# Patient Record
Sex: FEMALE | Race: WHITE | Employment: OTHER | URBAN - METROPOLITAN AREA
[De-identification: names, ages, dates, MRNs, and addresses within clinical notes are randomized per-mention and may not be internally consistent; named-entity substitution may affect disease eponyms.]

---

## 2020-02-07 ENCOUNTER — HOSPITAL ENCOUNTER (EMERGENCY)
Age: 80
Discharge: HOME | End: 2020-02-07
Attending: EMERGENCY MEDICINE
Payer: MEDICARE

## 2020-02-07 ENCOUNTER — APPOINTMENT (OUTPATIENT)
Dept: GENERAL RADIOLOGY | Age: 80
End: 2020-02-07
Payer: MEDICARE

## 2020-02-07 VITALS
SYSTOLIC BLOOD PRESSURE: 138 MMHG | OXYGEN SATURATION: 94 % | HEIGHT: 67 IN | HEART RATE: 64 BPM | RESPIRATION RATE: 23 BRPM | TEMPERATURE: 97.2 F | DIASTOLIC BLOOD PRESSURE: 68 MMHG | BODY MASS INDEX: 33.27 KG/M2 | WEIGHT: 212 LBS

## 2020-02-07 LAB
ALBUMIN SERPL-MCNC: 3.4 G/DL (ref 3.4–5)
ALP BLD-CCNC: 126 U/L (ref 40–129)
ALT SERPL-CCNC: 43 U/L (ref 10–40)
ANION GAP SERPL CALCULATED.3IONS-SCNC: 16 MMOL/L (ref 3–16)
AST SERPL-CCNC: 43 U/L (ref 15–37)
BASOPHILS ABSOLUTE: 0 K/UL (ref 0–0.2)
BASOPHILS RELATIVE PERCENT: 0.4 %
BILIRUB SERPL-MCNC: 0.3 MG/DL (ref 0–1)
BILIRUBIN DIRECT: <0.2 MG/DL (ref 0–0.3)
BILIRUBIN URINE: NEGATIVE
BILIRUBIN, INDIRECT: ABNORMAL MG/DL (ref 0–1)
BLOOD, URINE: ABNORMAL
BUN BLDV-MCNC: 23 MG/DL (ref 7–20)
CALCIUM SERPL-MCNC: 9.2 MG/DL (ref 8.3–10.6)
CHLORIDE BLD-SCNC: 104 MMOL/L (ref 99–110)
CLARITY: CLEAR
CO2: 21 MMOL/L (ref 21–32)
COLOR: YELLOW
CREAT SERPL-MCNC: 0.8 MG/DL (ref 0.6–1.2)
EKG ATRIAL RATE: 66 BPM
EKG ATRIAL RATE: 81 BPM
EKG DIAGNOSIS: NORMAL
EKG DIAGNOSIS: NORMAL
EKG P AXIS: 69 DEGREES
EKG P AXIS: 74 DEGREES
EKG P-R INTERVAL: 266 MS
EKG P-R INTERVAL: 270 MS
EKG Q-T INTERVAL: 408 MS
EKG Q-T INTERVAL: 426 MS
EKG QRS DURATION: 80 MS
EKG QRS DURATION: 92 MS
EKG QTC CALCULATION (BAZETT): 446 MS
EKG QTC CALCULATION (BAZETT): 473 MS
EKG R AXIS: 44 DEGREES
EKG R AXIS: 68 DEGREES
EKG T AXIS: 43 DEGREES
EKG T AXIS: 66 DEGREES
EKG VENTRICULAR RATE: 66 BPM
EKG VENTRICULAR RATE: 81 BPM
EOSINOPHILS ABSOLUTE: 0 K/UL (ref 0–0.6)
EOSINOPHILS RELATIVE PERCENT: 0 %
EPITHELIAL CELLS, UA: 3 /HPF (ref 0–5)
GFR AFRICAN AMERICAN: >60
GFR NON-AFRICAN AMERICAN: >60
GLUCOSE BLD-MCNC: 197 MG/DL (ref 70–99)
GLUCOSE URINE: NEGATIVE MG/DL
HCT VFR BLD CALC: 45.4 % (ref 36–48)
HEMOGLOBIN: 14.9 G/DL (ref 12–16)
HYALINE CASTS: 2 /LPF (ref 0–8)
INR BLD: 1 (ref 0.86–1.14)
KETONES, URINE: NEGATIVE MG/DL
LEUKOCYTE ESTERASE, URINE: NEGATIVE
LYMPHOCYTES ABSOLUTE: 0.5 K/UL (ref 1–5.1)
LYMPHOCYTES RELATIVE PERCENT: 7.9 %
MAGNESIUM: 1.8 MG/DL (ref 1.8–2.4)
MCH RBC QN AUTO: 31.4 PG (ref 26–34)
MCHC RBC AUTO-ENTMCNC: 32.8 G/DL (ref 31–36)
MCV RBC AUTO: 95.7 FL (ref 80–100)
MICROSCOPIC EXAMINATION: YES
MONOCYTES ABSOLUTE: 0.2 K/UL (ref 0–1.3)
MONOCYTES RELATIVE PERCENT: 2.4 %
NEUTROPHILS ABSOLUTE: 5.8 K/UL (ref 1.7–7.7)
NEUTROPHILS RELATIVE PERCENT: 89.3 %
NITRITE, URINE: NEGATIVE
PDW BLD-RTO: 16 % (ref 12.4–15.4)
PH UA: 5.5 (ref 5–8)
PLATELET # BLD: 280 K/UL (ref 135–450)
PMV BLD AUTO: 8.4 FL (ref 5–10.5)
POTASSIUM SERPL-SCNC: 5 MMOL/L (ref 3.5–5.1)
PRO-BNP: 649 PG/ML (ref 0–449)
PROTEIN UA: 30 MG/DL
PROTHROMBIN TIME: 11.6 SEC (ref 10–13.2)
RBC # BLD: 4.75 M/UL (ref 4–5.2)
RBC UA: 3 /HPF (ref 0–4)
SODIUM BLD-SCNC: 141 MMOL/L (ref 136–145)
SPECIFIC GRAVITY UA: 1.02 (ref 1–1.03)
TOTAL PROTEIN: 6.6 G/DL (ref 6.4–8.2)
TROPONIN: <0.01 NG/ML
TROPONIN: <0.01 NG/ML
URINE REFLEX TO CULTURE: YES
URINE TYPE: ABNORMAL
UROBILINOGEN, URINE: 0.2 E.U./DL
WBC # BLD: 6.5 K/UL (ref 4–11)
WBC UA: 23 /HPF (ref 0–5)

## 2020-02-07 PROCEDURE — 99285 EMERGENCY DEPT VISIT HI MDM: CPT

## 2020-02-07 PROCEDURE — 93005 ELECTROCARDIOGRAM TRACING: CPT | Performed by: EMERGENCY MEDICINE

## 2020-02-07 PROCEDURE — 93010 ELECTROCARDIOGRAM REPORT: CPT | Performed by: INTERNAL MEDICINE

## 2020-02-07 PROCEDURE — 84484 ASSAY OF TROPONIN QUANT: CPT

## 2020-02-07 PROCEDURE — 83880 ASSAY OF NATRIURETIC PEPTIDE: CPT

## 2020-02-07 PROCEDURE — 81001 URINALYSIS AUTO W/SCOPE: CPT

## 2020-02-07 PROCEDURE — 83735 ASSAY OF MAGNESIUM: CPT

## 2020-02-07 PROCEDURE — 96372 THER/PROPH/DIAG INJ SC/IM: CPT

## 2020-02-07 PROCEDURE — 93005 ELECTROCARDIOGRAM TRACING: CPT | Performed by: NURSE PRACTITIONER

## 2020-02-07 PROCEDURE — 87086 URINE CULTURE/COLONY COUNT: CPT

## 2020-02-07 PROCEDURE — 85025 COMPLETE CBC W/AUTO DIFF WBC: CPT

## 2020-02-07 PROCEDURE — 36415 COLL VENOUS BLD VENIPUNCTURE: CPT

## 2020-02-07 PROCEDURE — 6360000002 HC RX W HCPCS: Performed by: NURSE PRACTITIONER

## 2020-02-07 PROCEDURE — 80048 BASIC METABOLIC PNL TOTAL CA: CPT

## 2020-02-07 PROCEDURE — 80076 HEPATIC FUNCTION PANEL: CPT

## 2020-02-07 PROCEDURE — 71046 X-RAY EXAM CHEST 2 VIEWS: CPT

## 2020-02-07 PROCEDURE — 85610 PROTHROMBIN TIME: CPT

## 2020-02-07 RX ORDER — CEPHALEXIN 500 MG/1
500 CAPSULE ORAL 2 TIMES DAILY
Qty: 14 CAPSULE | Refills: 0 | Status: SHIPPED | OUTPATIENT
Start: 2020-02-07 | End: 2020-02-14

## 2020-02-07 RX ADMIN — ENOXAPARIN SODIUM 40 MG: 40 INJECTION SUBCUTANEOUS at 10:25

## 2020-02-07 NOTE — ED PROVIDER NOTES
chloride (KLOR-CON) 20 MEQ packet Take 20 mEq by mouth daily. methotrexate (RHEUMATREX) 2.5 MG chemo tablet Take 2.5 mg by mouth See Admin Instructions. 4 a week      hyoscyamine (ANASPAZ;LEVSIN) 0.125 MG tablet Take 0.125 mg by mouth every 6 hours as needed. PARoxetine (PAXIL) 40 MG tablet Take 40 mg by mouth every morning. atenolol (TENORMIN) 50 MG tablet Take 50 mg by mouth daily. valsartan (DIOVAN) 320 MG tablet Take 320 mg by mouth daily. Cholecalciferol (VITAMIN D3) 5000 UNITS TABS Take 10,000 Units by mouth every 7 days. therapeutic multivitamin-minerals (THERAGRAN-M) tablet Take 1 tablet by mouth daily. calcium carbonate (OSCAL) 500 MG TABS tablet Take 500 mg by mouth 2 times daily. ALLERGIES     Imitrex [sumatriptan] and Statins    FAMILYHISTORY     History reviewed. No pertinent family history. SOCIAL HISTORY       Social History     Tobacco Use    Smoking status: Never Smoker   Substance Use Topics    Alcohol use: No    Drug use: No       SCREENINGS             PHYSICAL EXAM    (up to 7 for level 4, 8 or more for level 5)     ED Triage Vitals [02/07/20 0432]   BP Temp Temp Source Pulse Resp SpO2 Height Weight   (!) 174/87 97.2 °F (36.2 °C) Infrared 82 18 98 % 5' 7\" (1.702 m) 212 lb (96.2 kg)       Physical Exam  Vitals signs and nursing note reviewed. Constitutional:       General: She is awake. Appearance: Normal appearance. She is well-developed and overweight. HENT:      Head: Normocephalic and atraumatic. Nose: Nose normal.   Eyes:      General:         Right eye: No discharge. Left eye: No discharge. Neck:      Musculoskeletal: Normal range of motion. Cardiovascular:      Rate and Rhythm: Normal rate and regular rhythm. Heart sounds: Normal heart sounds. Pulmonary:      Effort: Pulmonary effort is normal. No respiratory distress. Breath sounds: Normal breath sounds.    Abdominal: General: Bowel sounds are normal.      Palpations: Abdomen is soft. Tenderness: There is no abdominal tenderness. Musculoskeletal: Normal range of motion. Right lower le+ Edema present. Left lower le+ Edema present. Skin:     General: Skin is warm and dry. Coloration: Skin is not pale. Neurological:      Mental Status: She is alert and oriented to person, place, and time. Psychiatric:         Behavior: Behavior normal. Behavior is cooperative.          DIAGNOSTIC RESULTS   LABS:    Labs Reviewed   BASIC METABOLIC PANEL - Abnormal; Notable for the following components:       Result Value    Glucose 197 (*)     BUN 23 (*)     All other components within normal limits    Narrative:     Performed at:  [facility] [address]   Phone [phone]   CBC WITH AUTO DIFFERENTIAL - Abnormal; Notable for the following components:    RDW 16.0 (*)     Lymphocytes Absolute 0.5 (*)     All other components within normal limits    Narrative:     Performed at:  [facility] [address]   Phone [phone]   HEPATIC FUNCTION PANEL - Abnormal; Notable for the following components:    ALT 43 (*)     AST 43 (*)     All other components within normal limits    Narrative:     Performed at:  [facility] [address], [place]   Phone [phone]  - Abnormal; Notable for the following components:    Pro- (*)     All other components within normal limits    Narrative:     Performed at:  [facility] [address], [place]   Phone [phone]   TROPONIN    Narrative:     Performed at:  [facility] [address], [place]   Phone [phone]   TROPONIN    Narrative:     Performed at:  [facility]) - injection 40 mg (40 mg Subcutaneous Given 2/7/20 1025)         Pertinent Labs & Imaging studies reviewed. (See chart for details)   -  Patient seen and evaluated in the emergency department. -  Triage and nursing notes reviewed and incorporated. -  Old chart records reviewed and incorporated. -  Patient case discussed with attending physician, Dr. Minnie Molina. They saw and examined patient. -  Differential diagnosis includes: UTI, a-fib, dehydration, MI, ACS, vs  pneumonia,   -  Work-up included:  See above CBC, BMP, hepatic function, troponin x2, BNP UA, EKG, CXR  -  ED treatment included: Lovenox  - Consults: none   -  Results discussed with patient. Labs show  glucose 197, negative troponin, BUN 23. CBC is unremarkable. Pt was given strict return precautions. The patient is agreeable with plan of care and disposition.  -  Disposition: home      FINAL IMPRESSION      1. Atrial fibrillation, unspecified type ([facility] [address]    2.  Acute UTI          DISPOSITION/PLAN   DISPOSITION        PATIENT REFERREDTO:  Samson Yadav MD    Call in 2 days  As needed, If symptoms worsen    [facility] Emergency Department  [address]  Go to   As needed    Daryn Cabrera MD  [address] [place] [address] [facility]  [phone]    Call in 2 days      Ambrose Armstrong MD  [address] [place] [address] [address] [facility]  [phone]    Call in 2 days  As needed, If symptoms worsen      DISCHARGE MEDICATIONS:  Discharge Medication List as of 2/7/2020 10:34 AM      START taking these medications    Details   cephALEXin (KEFLEX) 500 MG capsule Take 1 capsule by mouth 2 times daily for 7 days, Disp-14 capsule, R-0Print             DISCONTINUED MEDICATIONS:  Discharge Medication List as of 2/7/2020 10:34 AM                 (Please note that portions of this note were completed with a voice recognition program.  Efforts were made to edit the dictations but occasionally words are mis-transcribed.)    Barnett Angelucci, APRN - CNP (electronically signed)            Barnett Angelucci, APRN - CNP  02/11/20 1940
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
## 2020-02-08 LAB — URINE CULTURE, ROUTINE: NORMAL

## 2020-11-06 ENCOUNTER — HOSPITAL ENCOUNTER (EMERGENCY)
Age: 80
Discharge: HOME | End: 2020-11-07
Attending: EMERGENCY MEDICINE
Payer: MEDICARE

## 2020-11-06 ENCOUNTER — APPOINTMENT (OUTPATIENT)
Dept: GENERAL RADIOLOGY | Age: 80
End: 2020-11-06
Payer: MEDICARE

## 2020-11-06 PROCEDURE — 99284 EMERGENCY DEPT VISIT MOD MDM: CPT

## 2020-11-06 PROCEDURE — 93005 ELECTROCARDIOGRAM TRACING: CPT | Performed by: EMERGENCY MEDICINE

## 2020-11-06 PROCEDURE — 71046 X-RAY EXAM CHEST 2 VIEWS: CPT

## 2020-11-07 VITALS
HEIGHT: 67 IN | RESPIRATION RATE: 20 BRPM | TEMPERATURE: 98.2 F | OXYGEN SATURATION: 98 % | SYSTOLIC BLOOD PRESSURE: 140 MMHG | WEIGHT: 210 LBS | HEART RATE: 79 BPM | DIASTOLIC BLOOD PRESSURE: 79 MMHG | BODY MASS INDEX: 32.96 KG/M2

## 2020-11-07 LAB
A/G RATIO: 1.3 (ref 1.1–2.2)
ALBUMIN SERPL-MCNC: 3.8 G/DL (ref 3.4–5)
ALP BLD-CCNC: 114 U/L (ref 40–129)
ALT SERPL-CCNC: 27 U/L (ref 10–40)
ANION GAP SERPL CALCULATED.3IONS-SCNC: 9 MMOL/L (ref 3–16)
AST SERPL-CCNC: 22 U/L (ref 15–37)
BASOPHILS ABSOLUTE: 0.1 K/UL (ref 0–0.2)
BASOPHILS RELATIVE PERCENT: 0.6 %
BILIRUB SERPL-MCNC: 0.5 MG/DL (ref 0–1)
BUN BLDV-MCNC: 28 MG/DL (ref 7–20)
CALCIUM SERPL-MCNC: 9.5 MG/DL (ref 8.3–10.6)
CHLORIDE BLD-SCNC: 103 MMOL/L (ref 99–110)
CO2: 27 MMOL/L (ref 21–32)
CREAT SERPL-MCNC: 0.8 MG/DL (ref 0.6–1.2)
EKG ATRIAL RATE: 68 BPM
EKG DIAGNOSIS: NORMAL
EKG P AXIS: 93 DEGREES
EKG P-R INTERVAL: 236 MS
EKG Q-T INTERVAL: 436 MS
EKG QRS DURATION: 98 MS
EKG QTC CALCULATION (BAZETT): 463 MS
EKG R AXIS: 34 DEGREES
EKG T AXIS: 36 DEGREES
EKG VENTRICULAR RATE: 68 BPM
EOSINOPHILS ABSOLUTE: 0.2 K/UL (ref 0–0.6)
EOSINOPHILS RELATIVE PERCENT: 2.3 %
GFR AFRICAN AMERICAN: >60
GFR NON-AFRICAN AMERICAN: >60
GLOBULIN: 3 G/DL
GLUCOSE BLD-MCNC: 114 MG/DL (ref 70–99)
HCT VFR BLD CALC: 42.5 % (ref 36–48)
HEMOGLOBIN: 14.2 G/DL (ref 12–16)
INR BLD: 1.28 (ref 0.86–1.14)
LYMPHOCYTES ABSOLUTE: 1.3 K/UL (ref 1–5.1)
LYMPHOCYTES RELATIVE PERCENT: 12.1 %
MCH RBC QN AUTO: 33.4 PG (ref 26–34)
MCHC RBC AUTO-ENTMCNC: 33.5 G/DL (ref 31–36)
MCV RBC AUTO: 99.6 FL (ref 80–100)
MONOCYTES ABSOLUTE: 0.9 K/UL (ref 0–1.3)
MONOCYTES RELATIVE PERCENT: 8.3 %
NEUTROPHILS ABSOLUTE: 8 K/UL (ref 1.7–7.7)
NEUTROPHILS RELATIVE PERCENT: 76.7 %
PDW BLD-RTO: 16.5 % (ref 12.4–15.4)
PLATELET # BLD: 237 K/UL (ref 135–450)
PMV BLD AUTO: 9.4 FL (ref 5–10.5)
POTASSIUM SERPL-SCNC: 3.9 MMOL/L (ref 3.5–5.1)
PRO-BNP: 59 PG/ML (ref 0–449)
PROTHROMBIN TIME: 14.9 SEC (ref 10–13.2)
RBC # BLD: 4.26 M/UL (ref 4–5.2)
REASON FOR REJECTION: NORMAL
REJECTED TEST: NORMAL
SODIUM BLD-SCNC: 139 MMOL/L (ref 136–145)
TOTAL PROTEIN: 6.8 G/DL (ref 6.4–8.2)
TROPONIN: <0.01 NG/ML
WBC # BLD: 10.4 K/UL (ref 4–11)

## 2020-11-07 PROCEDURE — 85025 COMPLETE CBC W/AUTO DIFF WBC: CPT

## 2020-11-07 PROCEDURE — 6370000000 HC RX 637 (ALT 250 FOR IP): Performed by: EMERGENCY MEDICINE

## 2020-11-07 PROCEDURE — 6370000000 HC RX 637 (ALT 250 FOR IP): Performed by: NURSE PRACTITIONER

## 2020-11-07 PROCEDURE — 83880 ASSAY OF NATRIURETIC PEPTIDE: CPT

## 2020-11-07 PROCEDURE — 85610 PROTHROMBIN TIME: CPT

## 2020-11-07 PROCEDURE — 84484 ASSAY OF TROPONIN QUANT: CPT

## 2020-11-07 PROCEDURE — 80053 COMPREHEN METABOLIC PANEL: CPT

## 2020-11-07 PROCEDURE — 93010 ELECTROCARDIOGRAM REPORT: CPT | Performed by: INTERNAL MEDICINE

## 2020-11-07 RX ORDER — WARFARIN SODIUM 5 MG/1
5 TABLET ORAL ONCE
Status: COMPLETED | OUTPATIENT
Start: 2020-11-07 | End: 2020-11-07

## 2020-11-07 RX ORDER — HYDROCODONE BITARTRATE AND ACETAMINOPHEN 5; 325 MG/1; MG/1
1 TABLET ORAL ONCE
Status: COMPLETED | OUTPATIENT
Start: 2020-11-07 | End: 2020-11-07

## 2020-11-07 RX ADMIN — WARFARIN SODIUM 5 MG: 5 TABLET ORAL at 04:31

## 2020-11-07 RX ADMIN — HYDROCODONE BITARTRATE AND ACETAMINOPHEN 1 TABLET: 5; 325 TABLET ORAL at 04:31

## 2020-11-07 ASSESSMENT — PAIN SCALES - GENERAL: PAINLEVEL_OUTOF10: 9

## 2020-11-07 ASSESSMENT — ENCOUNTER SYMPTOMS
SHORTNESS OF BREATH: 1
NAUSEA: 0
ABDOMINAL PAIN: 0
VOMITING: 0
DIARRHEA: 0
CHEST TIGHTNESS: 0

## 2020-11-07 NOTE — ED NOTES
Pt assisted up to [facility] x 2 throughout ED stay. Pt states she feels her heart \"beating really fast\" and asking if she's in afib after getting up. HR = 110 and regular. Denies chest pain or dizziness/lightheadedness. To be discharged home.      Alexandra Mcleod RN  11/07/20 0851

## 2020-11-07 NOTE — ED NOTES
Bed: 01  Expected date:   Expected time:   Means of arrival:   Comments:  critical     Kelton Shah RN  11/07/20 0041

## 2020-11-07 NOTE — ED PROVIDER NOTES
I independently performed a history and physical on Eduardo Robertson. All diagnostic, treatment, and disposition decisions were made by myself in conjunction with the advanced practice provider. Briefly, this is a 78 y.o. female here for A. fib with RVR. Started about 2:30 PM.  States that it lasted for roughly an hour. Counted her heart rate in 120s. Felt short of breath during this time but it seemed to persist after her heart rate improved. She did take an extra flecainide as instructed by her cardiologist for when this happens. Feels anxious. During my conversation with the patient, she states that her shortness of breath has significantly improved and she now wishes to go home. States that her Coumadin is likely low because she did not take her last dose    On exam,   General: Patient is in no acute distress  Skin: No cyanosis  HEENT: Moist mucous membranes  Heart: Regular rate, regular rhythm  Lung: No respiratory distress  Abdomen: Soft, nontender  Neuro: Moving all extremities, no facial droop, no slurred speech, answers questions appropriately        EKG  The Ekg interpreted by me in the absence of a cardiologist shows. Normal sinus rhythm  No acute ST changes. Nonspecific t wave abnormality    Screenings            MDM  Patient is an 49-year-old woman who presents with chief complaint of shortness of breath and A. fib RVR that self resolved after administration of flecainide at home. During my examination, shortness of breath has significantly improved. Never had any chest pain. No clear inciting factor for her A. fib. States that it does this once in a while. Currently in sinus rhythm. Blood work unremarkable at this time, with the exception of subtherapeutic INR. Discussed with pharmacy who recommended administration of Coumadin at this time and for patient to stay on 4 mg until her Coumadin clinic opens on Monday and she can call them.   They did not feel that she needed to be bridged at this time. Offered patient admission for observation and she did not wish to have this. Given timing of shortness of breath coinciding with onset of A. fib RVR and this being a chronic issue, I do not believe that this is due to ACS. Troponin unremarkable. Do not believe that this is due to pulmonary embolism. No current tachycardia, tachypnea, hypoxemia, unilateral DVT symptoms. All questions answered and return precautions given    Patient Referrals:  Marilia Obrien., MD    Schedule an appointment as soon as possible for a visit         Discharge Medications:  New Prescriptions    No medications on file       FINAL IMPRESSION  1. Subtherapeutic international normalized ratio (INR)    2. Paroxysmal atrial fibrillation (HCC)        Blood pressure (!) 161/74, pulse 74, temperature 98.2 °F (36.8 °C), temperature source Oral, resp. rate 19, height 5' 7\" (1.702 m), weight 210 lb (95.3 kg), SpO2 97 %. For further details of [facility] emergency department encounter, please see documentation by advanced practice provider, Olen Sandhoff.         Preeti Jones MD  11/07/20 4724

## 2020-11-07 NOTE — ED PROVIDER NOTES
tablet by mouth every 6 hours as needed. HYOSCYAMINE (ANASPAZ;LEVSIN) 0.125 MG TABLET    Take 0.125 mg by mouth every 6 hours as needed. INFLIXIMAB (REMICADE IV)    Infuse  intravenously. METHIMAZOLE (TAPAZOLE) 5 MG TABLET    Take 5 mg by mouth 3 times daily. METHOTREXATE (RHEUMATREX) 2.5 MG CHEMO TABLET    Take 2.5 mg by mouth See Admin Instructions. 4 a week    PAROXETINE (PAXIL) 40 MG TABLET    Take 40 mg by mouth every morning. POTASSIUM CHLORIDE (KLOR-CON) 20 MEQ PACKET    Take 20 mEq by mouth daily. THERAPEUTIC MULTIVITAMIN-MINERALS (THERAGRAN-M) TABLET    Take 1 tablet by mouth daily. VALSARTAN (DIOVAN) 320 MG TABLET    Take 320 mg by mouth daily. WARFARIN (COUMADIN) 3 MG TABLET    Take 1 tablet by mouth daily. ALLERGIES     Imitrex [sumatriptan] and Statins    FAMILYHISTORY     History reviewed. No pertinent family history. SOCIAL HISTORY       Social History     Tobacco Use    Smoking status: Never Smoker    Smokeless tobacco: Never Used   Substance Use Topics    Alcohol use: No    Drug use: No       SCREENINGS             PHYSICAL EXAM    (up to 7 for level 4, 8 or more for level 5)     ED Triage Vitals   BP Temp Temp Source Pulse Resp SpO2 Height Weight   11/06/20 1921 11/06/20 1921 11/06/20 1921 11/06/20 1921 11/06/20 1914 11/06/20 1921 11/06/20 1914 11/06/20 1914   122/80 98.2 °F (36.8 °C) Oral 71 16 95 % 5' 7\" (1.702 m) 210 lb (95.3 kg)       Physical Exam  Vitals signs and nursing note reviewed. Constitutional:       Appearance: She is well-developed. She is not diaphoretic. HENT:      Head: Normocephalic and atraumatic. Right Ear: External ear normal.      Left Ear: External ear normal.   Eyes:      General:         Right eye: No discharge. Left eye: No discharge. Neck:      Musculoskeletal: Normal range of motion and neck supple. Vascular: No JVD. Cardiovascular:      Rate and Rhythm: Normal rate and regular rhythm. Pulses: Normal pulses. Heart sounds: Normal heart sounds. Pulmonary:      Effort: Pulmonary effort is normal. No respiratory distress. Breath sounds: Normal breath sounds. Abdominal:      Palpations: Abdomen is soft. Musculoskeletal: Normal range of motion. Skin:     General: Skin is warm and dry. Coloration: Skin is not pale. Neurological:      Mental Status: She is alert and oriented to person, place, and time. Psychiatric:         Behavior: Behavior normal.         DIAGNOSTIC RESULTS   LABS:    Labs Reviewed   COMPREHENSIVE METABOLIC PANEL - Abnormal; Notable for the following components:       Result Value    Glucose 114 (*)     BUN 28 (*)     All other components within normal limits    Narrative:     Charly Dunne  [facility] tel. [phone],  Performed at:  [facility] [address], [place], [place]   Phone [phone]   PROTIME-INR - Abnormal; Notable for the following components:    Protime 14.9 (*)     INR 1.28 (*)     All other components within normal limits    Narrative:     Performed at:  [facility] [address], [place], [address] [employer]   Phone [phone]   CBC WITH AUTO DIFFERENTIAL - Abnormal; Notable for the following components:    RDW 16.5 (*)     Neutrophils Absolute 8.0 (*)     All other components within normal limits    Narrative:     Performed at:  [facility] [address], [place], [place]   Phone [phone]   TROPONIN    Narrative:     Charly Dunne  [facility] tel. [phone],  Performed at:  [facility] [address], [place]   Phone [phone]    Narrative:     Charly Dunne  [facility] tel. [phone],  Performed at:  [facility] [address],  [place]   Phone [phone]    Narrative:     DANISH Chambers  [facility] tel. [phone],  Performed at:  [facility]  [address],  [place], [address]   Phone [phone]       All other labs were within normal range or not returned as of this dictation. EKG: All EKG's are interpreted by the Emergency Department Physician in the absence of a cardiologist.  Please see their note for interpretation of EKG. RADIOLOGY:   Non-plain film images such as CT, Ultrasound and MRI are read by the radiologist. Plain radiographic images are visualized and preliminarily interpreted by the ED Provider with the below findings:        Interpretation per the Radiologist below, if available at the time of this note:    XR CHEST (2 VW)   Final Result   No acute cardiopulmonary disease is appreciated. Xr Chest (2 Vw)    Result Date: 11/6/2020  EXAMINATION: TWO XRAY VIEWS OF THE CHEST 11/6/2020 8:54 pm COMPARISON: 02/07/2020 HISTORY: ORDERING SYSTEM PROVIDED HISTORY: chest pain TECHNOLOGIST PROVIDED HISTORY: Reason for exam:->chest pain Reason for Exam: Shortness of Breath (pt in from home via [facility] ems reporting VSS, pt c/o SOB intermittently with hx of a-fib. pt c/o chills has had 3 negative COVID tests recently.) Acuity: Unknown Type of Exam: Unknown FINDINGS: The cardiac silhouette is normal.  Thoracic aorta is tortuous. There is scalloping of the right hemidiaphragm. No airspace consolidation is identified. No pneumothorax is seen. No acute osseous abnormality. No acute cardiopulmonary disease is appreciated. PROCEDURES   Unless otherwise noted below, none     Procedures    CRITICAL CARE TIME   The total critical care time spent while evaluating and treating this patient was at least 45 minutes. This excludes time spent doing separately billable procedures.   This includes time at the bedside, data interpretation, medication management, obtaining critical history from collateral sources if the patient is unable to provide it directly, and physician consultation. Specifics of interventions taken and potentially life-threatening diagnostic considerations are listed above in the medical decision making. CONSULTS:  None      EMERGENCY DEPARTMENT COURSE and DIFFERENTIAL DIAGNOSIS/MDM:   Vitals:    Vitals:    11/07/20 0400 11/07/20 0420 11/07/20 0440 11/07/20 0500   BP: (!) 156/66 138/84 (!) 162/76 (!) 161/74   Pulse: 72 75 77 74   Resp: 12 20 23 19   Temp:       TempSrc:       SpO2: 99% 96% 99% 97%   Weight:       Height:           Patient was given the following medications:  Medications   warfarin (COUMADIN) tablet 5 mg (5 mg Oral Given 11/7/20 0431)   HYDROcodone-acetaminophen (NORCO) 5-325 MG per tablet 1 tablet (1 tablet Oral Given 11/7/20 0431)           Briefly, this is a 78year old female that presents with paroxysmal atrial fibrillation, states that her pulse rate was very fast, stating more than 100. States her blood pressure was elevated and she was increasingly short of breath. States that her heart rhythm has flipped back to sinus and her extended wait in the emergency department waiting room this evening although she does remain slightly short of breath. XR CHEST (2 VW) (Final result)   Result time 11/06/20 21:18:02   Final result by Symone Lyle MD (11/06/20 21:18:02)                 Impression:     No acute cardiopulmonary disease is appreciated. CBC is unremarkable. CMP unremarkable. Troponin negative. INR subtherapeutic at 1.28. BNP 59. XR CHEST (2 VW) (Final result)   Result time 11/06/20 21:18:02   Final result by Symone Lyle MD (11/06/20 21:18:02)                 Impression:     No acute cardiopulmonary disease is appreciated. Did call pharmacy and an additional dose of warfarin was suggested for tonight, patient should continue her 4 mg nightly and have her INR rechecked Monday.     We did discuss admission versus discharge home, patient states that she is actually feeling much better and would like to be discharged. We did discuss close outpatient follow-up and strict return precautions. Patient is agreeable regarding plan of care. She is in sinus rhythm. Patient's oxygen saturations are good. I do not believe the patient's symptoms today are due to pulmonary embolism, pulmonary edema, pneumonia, pneumothorax, ACS, CHF, status asthmaticus, acute respiratory failure, profound anemia or metabolic abnormality, amongst other more emergent diagnostic considerations. Patient was advised to immediately return to emergency department if symptoms worsen. FINAL IMPRESSION      1.  Subtherapeutic international normalized ratio (INR)    2. Paroxysmal atrial fibrillation [facility])          DISPOSITION/PLAN   DISPOSITION Decision To Discharge 11/07/2020 05:08:00 AM      PATIENT REFERREDTO:  Swati Geller MD    Schedule an appointment as soon as possible for a visit         DISCHARGE MEDICATIONS:  New Prescriptions    No medications on file       DISCONTINUED MEDICATIONS:  Discontinued Medications    No medications on file              (Please note that portions of this note were completed with a voice recognition program.  Efforts were made to edit the dictations but occasionally words are mis-transcribed.)    LAUREN Pearson CNP (electronically signed)           LAUREN Pearson CNP  11/07/20 4274

## 2021-03-05 NOTE — PROGRESS NOTES
[facility]  [phone]        Pre-Op Phone Call:     Patient Name: Marshall Beverly     Telephone Information:   Mobile [phone]     Home phone:  [phone]    Surgery Time:   [phone] Time:  1010     Left extended Message: n/a     Message left with: n/a     Recent change in health status:  No     Advised of transportation/ policy:  Follow md instruction    NPO policy reviewed:  NA     Advised to take morning heart/blood pressure medications with sips of water morning of surgery? Yes     Instructed to bring eye drops, photo identification, and insurance card day of surgery? Yes     Advised to wear short sleeved button down shirt (no T-shirt underneath):  NA     Advised not to wear jewelry, hairpins, or pantyhose day of surgery? NA     Advised not to wear make-up and to wash face day of surgery?   NA    Remarks:        Electronically signed by:  Emmett Angel RN at 3/5/2021 12:01 PM

## 2021-03-08 ENCOUNTER — HOSPITAL ENCOUNTER (OUTPATIENT)
Dept: SURGERY | Age: 81
Discharge: HOME | End: 2021-03-08
Payer: MEDICARE

## 2021-03-08 VITALS — DIASTOLIC BLOOD PRESSURE: 77 MMHG | SYSTOLIC BLOOD PRESSURE: 135 MMHG

## 2021-03-08 PROCEDURE — 6370000000 HC RX 637 (ALT 250 FOR IP): Performed by: OPHTHALMOLOGY

## 2021-03-08 PROCEDURE — 66821 AFTER CATARACT LASER SURGERY: CPT

## 2021-03-08 RX ORDER — SODIUM CHLORIDE 0.9 % (FLUSH) 0.9 %
10 SYRINGE (ML) INJECTION EVERY 12 HOURS SCHEDULED
Status: DISCONTINUED | OUTPATIENT
Start: 2021-03-08 | End: 2021-03-09 | Stop reason: HOSPADM

## 2021-03-08 RX ORDER — PHENYLEPHRINE HCL 2.5 %
1 DROPS OPHTHALMIC (EYE) ONCE
Status: COMPLETED | OUTPATIENT
Start: 2021-03-08 | End: 2021-03-08

## 2021-03-08 RX ORDER — SODIUM CHLORIDE 0.9 % (FLUSH) 0.9 %
10 SYRINGE (ML) INJECTION PRN
Status: DISCONTINUED | OUTPATIENT
Start: 2021-03-08 | End: 2021-03-09 | Stop reason: HOSPADM

## 2021-03-08 RX ORDER — TROPICAMIDE 10 MG/ML
1 SOLUTION/ DROPS OPHTHALMIC ONCE
Status: COMPLETED | OUTPATIENT
Start: 2021-03-08 | End: 2021-03-08

## 2021-03-08 RX ADMIN — TROPICAMIDE 1 DROP: 10 SOLUTION/ DROPS OPHTHALMIC at 10:42

## 2021-03-08 RX ADMIN — PHENYLEPHRINE HYDROCHLORIDE 1 DROP: 25 SOLUTION/ DROPS OPHTHALMIC at 10:42

## 2021-03-08 ASSESSMENT — PAIN - FUNCTIONAL ASSESSMENT: PAIN_FUNCTIONAL_ASSESSMENT: 0-10

## 2021-03-08 NOTE — OP NOTE
[facility]  [address] [address] [address] [place] [address]  [phone]      3/8/2021    Patient name: Nick Chu  YOB: 1940  MRN: [id number]    Allergies: Allergies   Allergen Reactions    Imitrex [Sumatriptan] Other (See Comments)     A-fib    Statins Other (See Comments)     Leg cramps       Pre-operative diagnosis:  After cataract obscuring vision of  both eyes. Post-operative diagnosis:  Same    Procedure Performed:  YAG Capsulotomy of both eyes. Anesthesia:  None    Estimated Blood Loss: None    Specimens removed: None    Complications:  None    Description of Procedure: A Yag Capsulotomy was performed today on both the left and right eyes. Pt appears to be oriented to time, place, and person. Pre-op medications instilled in the left eye: Proparacaine 1 drop, Palmer-Synephrine 2.5% 1 drop topical and Mydriacyl 0.5% 1 drop topical. Patient is comfortable and will be released to self.      Electronically signed by: Roberto Dow MD,3/8/2021,11:49 AM

## 2021-03-09 ENCOUNTER — HOSPITAL ENCOUNTER (EMERGENCY)
Age: 81
Discharge: HOME | End: 2021-03-09
Attending: EMERGENCY MEDICINE
Payer: MEDICARE

## 2021-03-09 VITALS
DIASTOLIC BLOOD PRESSURE: 90 MMHG | RESPIRATION RATE: 20 BRPM | BODY MASS INDEX: 33.59 KG/M2 | TEMPERATURE: 97.6 F | SYSTOLIC BLOOD PRESSURE: 187 MMHG | OXYGEN SATURATION: 98 % | HEART RATE: 81 BPM | HEIGHT: 67 IN | WEIGHT: 214 LBS

## 2021-03-09 DIAGNOSIS — I10 HYPERTENSION, UNSPECIFIED TYPE: Primary | ICD-10-CM

## 2021-03-09 LAB
A/G RATIO: 1.2 (ref 1.1–2.2)
ALBUMIN SERPL-MCNC: 3.5 G/DL (ref 3.4–5)
ALP BLD-CCNC: 130 U/L (ref 40–129)
ALT SERPL-CCNC: 38 U/L (ref 10–40)
ANION GAP SERPL CALCULATED.3IONS-SCNC: 12 MMOL/L (ref 3–16)
APTT: 40.6 SEC (ref 24.2–36.2)
AST SERPL-CCNC: 36 U/L (ref 15–37)
BASOPHILS ABSOLUTE: 0 K/UL (ref 0–0.2)
BASOPHILS RELATIVE PERCENT: 0.2 %
BILIRUB SERPL-MCNC: 0.4 MG/DL (ref 0–1)
BUN BLDV-MCNC: 16 MG/DL (ref 7–20)
CALCIUM SERPL-MCNC: 9 MG/DL (ref 8.3–10.6)
CHLORIDE BLD-SCNC: 101 MMOL/L (ref 99–110)
CO2: 26 MMOL/L (ref 21–32)
CREAT SERPL-MCNC: 0.7 MG/DL (ref 0.6–1.2)
EOSINOPHILS ABSOLUTE: 0.4 K/UL (ref 0–0.6)
EOSINOPHILS RELATIVE PERCENT: 5.2 %
GFR AFRICAN AMERICAN: >60
GFR NON-AFRICAN AMERICAN: >60
GLOBULIN: 2.9 G/DL
GLUCOSE BLD-MCNC: 92 MG/DL (ref 70–99)
HCT VFR BLD CALC: 41.8 % (ref 36–48)
HEMOGLOBIN: 13.7 G/DL (ref 12–16)
INR BLD: 1.79 (ref 0.86–1.14)
LYMPHOCYTES ABSOLUTE: 0.8 K/UL (ref 1–5.1)
LYMPHOCYTES RELATIVE PERCENT: 11 %
MAGNESIUM: 1.4 MG/DL (ref 1.8–2.4)
MCH RBC QN AUTO: 33.3 PG (ref 26–34)
MCHC RBC AUTO-ENTMCNC: 32.8 G/DL (ref 31–36)
MCV RBC AUTO: 101.5 FL (ref 80–100)
MONOCYTES ABSOLUTE: 0.4 K/UL (ref 0–1.3)
MONOCYTES RELATIVE PERCENT: 5.6 %
NEUTROPHILS ABSOLUTE: 5.5 K/UL (ref 1.7–7.7)
NEUTROPHILS RELATIVE PERCENT: 78 %
PDW BLD-RTO: 15.2 % (ref 12.4–15.4)
PLATELET # BLD: 226 K/UL (ref 135–450)
PMV BLD AUTO: 7.9 FL (ref 5–10.5)
POTASSIUM REFLEX MAGNESIUM: 3.5 MMOL/L (ref 3.5–5.1)
PROTHROMBIN TIME: 20.9 SEC (ref 10–13.2)
RBC # BLD: 4.12 M/UL (ref 4–5.2)
SODIUM BLD-SCNC: 139 MMOL/L (ref 136–145)
TOTAL PROTEIN: 6.4 G/DL (ref 6.4–8.2)
TROPONIN: <0.01 NG/ML
WBC # BLD: 7.1 K/UL (ref 4–11)

## 2021-03-09 PROCEDURE — 85730 THROMBOPLASTIN TIME PARTIAL: CPT

## 2021-03-09 PROCEDURE — 84484 ASSAY OF TROPONIN QUANT: CPT

## 2021-03-09 PROCEDURE — 85610 PROTHROMBIN TIME: CPT

## 2021-03-09 PROCEDURE — 93005 ELECTROCARDIOGRAM TRACING: CPT | Performed by: EMERGENCY MEDICINE

## 2021-03-09 PROCEDURE — 85025 COMPLETE CBC W/AUTO DIFF WBC: CPT

## 2021-03-09 PROCEDURE — 6370000000 HC RX 637 (ALT 250 FOR IP): Performed by: EMERGENCY MEDICINE

## 2021-03-09 PROCEDURE — 80053 COMPREHEN METABOLIC PANEL: CPT

## 2021-03-09 PROCEDURE — 99284 EMERGENCY DEPT VISIT MOD MDM: CPT

## 2021-03-09 PROCEDURE — 83735 ASSAY OF MAGNESIUM: CPT

## 2021-03-09 RX ORDER — CLONIDINE HYDROCHLORIDE 0.1 MG/1
0.2 TABLET ORAL ONCE
Status: COMPLETED | OUTPATIENT
Start: 2021-03-09 | End: 2021-03-09

## 2021-03-09 RX ORDER — LANOLIN ALCOHOL/MO/W.PET/CERES
800 CREAM (GRAM) TOPICAL ONCE
Status: COMPLETED | OUTPATIENT
Start: 2021-03-09 | End: 2021-03-09

## 2021-03-09 RX ADMIN — Medication 800 MG: at 20:20

## 2021-03-09 RX ADMIN — CLONIDINE HYDROCHLORIDE 0.2 MG: 0.1 TABLET ORAL at 17:55

## 2021-03-09 ASSESSMENT — PAIN DESCRIPTION - LOCATION: LOCATION: HIP

## 2021-03-09 ASSESSMENT — PAIN DESCRIPTION - PAIN TYPE: TYPE: ACUTE PAIN

## 2021-03-10 LAB
EKG ATRIAL RATE: 70 BPM
EKG ATRIAL RATE: 71 BPM
EKG DIAGNOSIS: NORMAL
EKG DIAGNOSIS: NORMAL
EKG P AXIS: 117 DEGREES
EKG P AXIS: 79 DEGREES
EKG P-R INTERVAL: 232 MS
EKG P-R INTERVAL: 242 MS
EKG Q-T INTERVAL: 424 MS
EKG Q-T INTERVAL: 430 MS
EKG QRS DURATION: 82 MS
EKG QRS DURATION: 90 MS
EKG QTC CALCULATION (BAZETT): 457 MS
EKG QTC CALCULATION (BAZETT): 467 MS
EKG R AXIS: 52 DEGREES
EKG R AXIS: 69 DEGREES
EKG T AXIS: 50 DEGREES
EKG T AXIS: 55 DEGREES
EKG VENTRICULAR RATE: 70 BPM
EKG VENTRICULAR RATE: 71 BPM

## 2021-03-10 PROCEDURE — 93010 ELECTROCARDIOGRAM REPORT: CPT | Performed by: INTERNAL MEDICINE
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
## 2022-03-03 ENCOUNTER — APPOINTMENT (OUTPATIENT)
Dept: GENERAL RADIOLOGY | Age: 82
DRG: 657 | End: 2022-03-03
Payer: MEDICARE

## 2022-03-03 ENCOUNTER — HOSPITAL ENCOUNTER (INPATIENT)
Age: 82
LOS: 4 days | Discharge: HOME | DRG: 657 | End: 2022-03-07
Attending: EMERGENCY MEDICINE | Admitting: INTERNAL MEDICINE
Payer: MEDICARE

## 2022-03-03 ENCOUNTER — APPOINTMENT (OUTPATIENT)
Dept: CT IMAGING | Age: 82
DRG: 657 | End: 2022-03-03
Payer: MEDICARE

## 2022-03-03 DIAGNOSIS — R11.0 NAUSEA: ICD-10-CM

## 2022-03-03 DIAGNOSIS — R25.1 TREMOR: Primary | ICD-10-CM

## 2022-03-03 DIAGNOSIS — R10.9 RIGHT SIDED ABDOMINAL PAIN: ICD-10-CM

## 2022-03-03 DIAGNOSIS — R07.9 CHEST PAIN, UNSPECIFIED TYPE: ICD-10-CM

## 2022-03-03 PROBLEM — N13.30 HYDRONEPHROSIS, RIGHT: Status: ACTIVE | Noted: 2022-03-03

## 2022-03-03 PROBLEM — R79.89 ELEVATED LFTS: Status: ACTIVE | Noted: 2022-03-03

## 2022-03-03 PROBLEM — N28.89 RENAL MASS, RIGHT: Status: ACTIVE | Noted: 2022-03-03

## 2022-03-03 PROBLEM — M06.9 RA (RHEUMATOID ARTHRITIS) (HCC): Status: ACTIVE | Noted: 2022-03-03

## 2022-03-03 LAB
A/G RATIO: 1.2 (ref 1.1–2.2)
ALBUMIN SERPL-MCNC: 3.8 G/DL (ref 3.4–5)
ALP BLD-CCNC: 144 U/L (ref 40–129)
ALT SERPL-CCNC: 48 U/L (ref 10–40)
ANION GAP SERPL CALCULATED.3IONS-SCNC: 13 MMOL/L (ref 3–16)
AST SERPL-CCNC: 41 U/L (ref 15–37)
BASOPHILS ABSOLUTE: 0 K/UL (ref 0–0.2)
BASOPHILS RELATIVE PERCENT: 0.2 %
BILIRUB SERPL-MCNC: 0.4 MG/DL (ref 0–1)
BILIRUBIN URINE: NEGATIVE
BLOOD, URINE: ABNORMAL
BUN BLDV-MCNC: 17 MG/DL (ref 7–20)
CALCIUM SERPL-MCNC: 9.5 MG/DL (ref 8.3–10.6)
CHLORIDE BLD-SCNC: 97 MMOL/L (ref 99–110)
CLARITY: CLEAR
CO2: 26 MMOL/L (ref 21–32)
COLOR: YELLOW
CREAT SERPL-MCNC: 1.1 MG/DL (ref 0.6–1.2)
EKG ATRIAL RATE: 80 BPM
EKG DIAGNOSIS: NORMAL
EKG P AXIS: 82 DEGREES
EKG P-R INTERVAL: 316 MS
EKG Q-T INTERVAL: 416 MS
EKG QRS DURATION: 126 MS
EKG QTC CALCULATION (BAZETT): 479 MS
EKG R AXIS: 83 DEGREES
EKG T AXIS: -2 DEGREES
EKG VENTRICULAR RATE: 80 BPM
EOSINOPHILS ABSOLUTE: 0.1 K/UL (ref 0–0.6)
EOSINOPHILS RELATIVE PERCENT: 1.1 %
EPITHELIAL CELLS, UA: 0 /HPF (ref 0–5)
GFR AFRICAN AMERICAN: 57
GFR NON-AFRICAN AMERICAN: 47
GLUCOSE BLD-MCNC: 122 MG/DL (ref 70–99)
GLUCOSE URINE: NEGATIVE MG/DL
HCT VFR BLD CALC: 38.4 % (ref 36–48)
HEMOGLOBIN: 12.9 G/DL (ref 12–16)
HYALINE CASTS: 1 /LPF (ref 0–8)
INR BLD: 2.84 (ref 0.88–1.12)
KETONES, URINE: NEGATIVE MG/DL
LEUKOCYTE ESTERASE, URINE: NEGATIVE
LIPASE: 55 U/L (ref 13–60)
LYMPHOCYTES ABSOLUTE: 0.4 K/UL (ref 1–5.1)
LYMPHOCYTES RELATIVE PERCENT: 8.8 %
MAGNESIUM: 1.6 MG/DL (ref 1.8–2.4)
MCH RBC QN AUTO: 31.4 PG (ref 26–34)
MCHC RBC AUTO-ENTMCNC: 33.4 G/DL (ref 31–36)
MCV RBC AUTO: 94 FL (ref 80–100)
MICROSCOPIC EXAMINATION: YES
MONOCYTES ABSOLUTE: 0.2 K/UL (ref 0–1.3)
MONOCYTES RELATIVE PERCENT: 3.6 %
NEUTROPHILS ABSOLUTE: 4 K/UL (ref 1.7–7.7)
NEUTROPHILS RELATIVE PERCENT: 86.3 %
NITRITE, URINE: NEGATIVE
PDW BLD-RTO: 16.2 % (ref 12.4–15.4)
PH UA: 7.5 (ref 5–8)
PLATELET # BLD: 269 K/UL (ref 135–450)
PMV BLD AUTO: 8 FL (ref 5–10.5)
POTASSIUM REFLEX MAGNESIUM: 3.5 MMOL/L (ref 3.5–5.1)
PROTEIN UA: NEGATIVE MG/DL
PROTHROMBIN TIME: 33.5 SEC (ref 9.9–12.7)
RBC # BLD: 4.09 M/UL (ref 4–5.2)
RBC UA: 5 /HPF (ref 0–4)
SODIUM BLD-SCNC: 136 MMOL/L (ref 136–145)
SPECIFIC GRAVITY UA: 1.02 (ref 1–1.03)
TOTAL PROTEIN: 6.9 G/DL (ref 6.4–8.2)
TROPONIN: <0.01 NG/ML
URINE REFLEX TO CULTURE: ABNORMAL
URINE TYPE: ABNORMAL
UROBILINOGEN, URINE: 0.2 E.U./DL
WBC # BLD: 4.6 K/UL (ref 4–11)
WBC UA: 1 /HPF (ref 0–5)

## 2022-03-03 PROCEDURE — 93010 ELECTROCARDIOGRAM REPORT: CPT | Performed by: INTERNAL MEDICINE

## 2022-03-03 PROCEDURE — 6370000000 HC RX 637 (ALT 250 FOR IP): Performed by: INTERNAL MEDICINE

## 2022-03-03 PROCEDURE — 96374 THER/PROPH/DIAG INJ IV PUSH: CPT

## 2022-03-03 PROCEDURE — 70450 CT HEAD/BRAIN W/O DYE: CPT

## 2022-03-03 PROCEDURE — 6360000004 HC RX CONTRAST MEDICATION: Performed by: PHYSICIAN ASSISTANT

## 2022-03-03 PROCEDURE — 84484 ASSAY OF TROPONIN QUANT: CPT

## 2022-03-03 PROCEDURE — 99285 EMERGENCY DEPT VISIT HI MDM: CPT

## 2022-03-03 PROCEDURE — 85610 PROTHROMBIN TIME: CPT

## 2022-03-03 PROCEDURE — 1200000000 HC SEMI PRIVATE

## 2022-03-03 PROCEDURE — 2580000003 HC RX 258: Performed by: INTERNAL MEDICINE

## 2022-03-03 PROCEDURE — 93005 ELECTROCARDIOGRAM TRACING: CPT | Performed by: EMERGENCY MEDICINE

## 2022-03-03 PROCEDURE — 80053 COMPREHEN METABOLIC PANEL: CPT

## 2022-03-03 PROCEDURE — 81001 URINALYSIS AUTO W/SCOPE: CPT

## 2022-03-03 PROCEDURE — 2700000000 HC OXYGEN THERAPY PER DAY

## 2022-03-03 PROCEDURE — 6360000002 HC RX W HCPCS: Performed by: PHYSICIAN ASSISTANT

## 2022-03-03 PROCEDURE — 71045 X-RAY EXAM CHEST 1 VIEW: CPT

## 2022-03-03 PROCEDURE — 74177 CT ABD & PELVIS W/CONTRAST: CPT

## 2022-03-03 PROCEDURE — 96375 TX/PRO/DX INJ NEW DRUG ADDON: CPT

## 2022-03-03 PROCEDURE — 74176 CT ABD & PELVIS W/O CONTRAST: CPT

## 2022-03-03 PROCEDURE — 83690 ASSAY OF LIPASE: CPT

## 2022-03-03 PROCEDURE — 83735 ASSAY OF MAGNESIUM: CPT

## 2022-03-03 PROCEDURE — 6360000002 HC RX W HCPCS: Performed by: INTERNAL MEDICINE

## 2022-03-03 PROCEDURE — 85025 COMPLETE CBC W/AUTO DIFF WBC: CPT

## 2022-03-03 RX ORDER — ONDANSETRON 2 MG/ML
4 INJECTION INTRAMUSCULAR; INTRAVENOUS ONCE
Status: COMPLETED | OUTPATIENT
Start: 2022-03-03 | End: 2022-03-03

## 2022-03-03 RX ORDER — WARFARIN SODIUM 2 MG/1
4 TABLET ORAL
Status: DISCONTINUED | OUTPATIENT
Start: 2022-03-03 | End: 2022-03-04

## 2022-03-03 RX ORDER — FUROSEMIDE 40 MG/1
20 TABLET ORAL 2 TIMES DAILY
Status: DISCONTINUED | OUTPATIENT
Start: 2022-03-03 | End: 2022-03-03

## 2022-03-03 RX ORDER — ACETAMINOPHEN 325 MG/1
650 TABLET ORAL EVERY 6 HOURS PRN
Status: DISCONTINUED | OUTPATIENT
Start: 2022-03-03 | End: 2022-03-07 | Stop reason: HOSPADM

## 2022-03-03 RX ORDER — ONDANSETRON 2 MG/ML
4 INJECTION INTRAMUSCULAR; INTRAVENOUS EVERY 6 HOURS PRN
Status: DISCONTINUED | OUTPATIENT
Start: 2022-03-03 | End: 2022-03-07 | Stop reason: HOSPADM

## 2022-03-03 RX ORDER — SODIUM CHLORIDE 0.9 % (FLUSH) 0.9 %
5-40 SYRINGE (ML) INJECTION PRN
Status: DISCONTINUED | OUTPATIENT
Start: 2022-03-03 | End: 2022-03-07 | Stop reason: HOSPADM

## 2022-03-03 RX ORDER — SODIUM CHLORIDE 9 MG/ML
25 INJECTION, SOLUTION INTRAVENOUS PRN
Status: DISCONTINUED | OUTPATIENT
Start: 2022-03-03 | End: 2022-03-07 | Stop reason: HOSPADM

## 2022-03-03 RX ORDER — M-VIT,TX,IRON,MINS/CALC/FOLIC 27MG-0.4MG
1 TABLET ORAL DAILY
Status: DISCONTINUED | OUTPATIENT
Start: 2022-03-04 | End: 2022-03-07 | Stop reason: HOSPADM

## 2022-03-03 RX ORDER — HYDROCODONE BITARTRATE AND ACETAMINOPHEN 5; 325 MG/1; MG/1
1 TABLET ORAL EVERY 6 HOURS PRN
Status: DISCONTINUED | OUTPATIENT
Start: 2022-03-03 | End: 2022-03-04

## 2022-03-03 RX ORDER — PAROXETINE HYDROCHLORIDE 20 MG/1
40 TABLET, FILM COATED ORAL EVERY MORNING
Status: DISCONTINUED | OUTPATIENT
Start: 2022-03-04 | End: 2022-03-05

## 2022-03-03 RX ORDER — LORAZEPAM 2 MG/ML
0.5 INJECTION INTRAMUSCULAR ONCE
Status: COMPLETED | OUTPATIENT
Start: 2022-03-03 | End: 2022-03-03

## 2022-03-03 RX ORDER — METHIMAZOLE 5 MG/1
5 TABLET ORAL 3 TIMES DAILY
Status: DISCONTINUED | OUTPATIENT
Start: 2022-03-03 | End: 2022-03-07 | Stop reason: HOSPADM

## 2022-03-03 RX ORDER — EZETIMIBE 10 MG/1
10 TABLET ORAL DAILY
Status: DISCONTINUED | OUTPATIENT
Start: 2022-03-03 | End: 2022-03-03 | Stop reason: RX

## 2022-03-03 RX ORDER — ONDANSETRON 2 MG/ML
4 INJECTION INTRAMUSCULAR; INTRAVENOUS ONCE
Status: DISCONTINUED | OUTPATIENT
Start: 2022-03-03 | End: 2022-03-03

## 2022-03-03 RX ORDER — SODIUM CHLORIDE 0.9 % (FLUSH) 0.9 %
5-40 SYRINGE (ML) INJECTION EVERY 12 HOURS SCHEDULED
Status: DISCONTINUED | OUTPATIENT
Start: 2022-03-03 | End: 2022-03-07 | Stop reason: HOSPADM

## 2022-03-03 RX ORDER — VALSARTAN 160 MG/1
320 TABLET ORAL DAILY
Status: DISCONTINUED | OUTPATIENT
Start: 2022-03-03 | End: 2022-03-04

## 2022-03-03 RX ORDER — ONDANSETRON 4 MG/1
4 TABLET, ORALLY DISINTEGRATING ORAL EVERY 8 HOURS PRN
Status: DISCONTINUED | OUTPATIENT
Start: 2022-03-03 | End: 2022-03-07 | Stop reason: HOSPADM

## 2022-03-03 RX ORDER — KETOROLAC TROMETHAMINE 30 MG/ML
15 INJECTION, SOLUTION INTRAMUSCULAR; INTRAVENOUS EVERY 6 HOURS PRN
Status: COMPLETED | OUTPATIENT
Start: 2022-03-03 | End: 2022-03-04

## 2022-03-03 RX ORDER — LORAZEPAM 2 MG/ML
0.5 INJECTION INTRAMUSCULAR ONCE
Status: DISCONTINUED | OUTPATIENT
Start: 2022-03-03 | End: 2022-03-03

## 2022-03-03 RX ORDER — CARVEDILOL 3.12 MG/1
12.5 TABLET ORAL 2 TIMES DAILY WITH MEALS
Status: DISCONTINUED | OUTPATIENT
Start: 2022-03-03 | End: 2022-03-07 | Stop reason: HOSPADM

## 2022-03-03 RX ORDER — POLYETHYLENE GLYCOL 3350 17 G/17G
17 POWDER, FOR SOLUTION ORAL DAILY PRN
Status: DISCONTINUED | OUTPATIENT
Start: 2022-03-03 | End: 2022-03-07 | Stop reason: HOSPADM

## 2022-03-03 RX ORDER — ACETAMINOPHEN 650 MG/1
650 SUPPOSITORY RECTAL EVERY 6 HOURS PRN
Status: DISCONTINUED | OUTPATIENT
Start: 2022-03-03 | End: 2022-03-07 | Stop reason: HOSPADM

## 2022-03-03 RX ORDER — FLECAINIDE ACETATE 100 MG/1
100 TABLET ORAL 2 TIMES DAILY
Status: DISCONTINUED | OUTPATIENT
Start: 2022-03-03 | End: 2022-03-07 | Stop reason: HOSPADM

## 2022-03-03 RX ORDER — HYDROCODONE BITARTRATE AND ACETAMINOPHEN 10; 325 MG/1; MG/1
1 TABLET ORAL EVERY 6 HOURS PRN
Status: DISCONTINUED | OUTPATIENT
Start: 2022-03-03 | End: 2022-03-04

## 2022-03-03 RX ORDER — HYDRALAZINE HYDROCHLORIDE 20 MG/ML
10 INJECTION INTRAMUSCULAR; INTRAVENOUS EVERY 4 HOURS PRN
Status: DISCONTINUED | OUTPATIENT
Start: 2022-03-03 | End: 2022-03-07 | Stop reason: HOSPADM

## 2022-03-03 RX ORDER — FOLIC ACID 1 MG/1
1 TABLET ORAL DAILY
Status: DISCONTINUED | OUTPATIENT
Start: 2022-03-04 | End: 2022-03-07 | Stop reason: HOSPADM

## 2022-03-03 RX ORDER — DIAZEPAM 2 MG/1
2 TABLET ORAL 2 TIMES DAILY PRN
Status: DISCONTINUED | OUTPATIENT
Start: 2022-03-03 | End: 2022-03-04

## 2022-03-03 RX ORDER — WARFARIN SODIUM 2 MG/1
2 TABLET ORAL
Status: DISCONTINUED | OUTPATIENT
Start: 2022-03-04 | End: 2022-03-04

## 2022-03-03 RX ADMIN — ONDANSETRON 4 MG: 2 INJECTION INTRAMUSCULAR; INTRAVENOUS at 11:59

## 2022-03-03 RX ADMIN — IOPAMIDOL 75 ML: 755 INJECTION, SOLUTION INTRAVENOUS at 13:18

## 2022-03-03 RX ADMIN — CARVEDILOL 12.5 MG: 3.12 TABLET, FILM COATED ORAL at 21:26

## 2022-03-03 RX ADMIN — METHIMAZOLE 5 MG: 5 TABLET ORAL at 21:27

## 2022-03-03 RX ADMIN — FLECAINIDE ACETATE 100 MG: 100 TABLET ORAL at 21:26

## 2022-03-03 RX ADMIN — VALSARTAN 320 MG: 160 TABLET, FILM COATED ORAL at 21:27

## 2022-03-03 RX ADMIN — KETOROLAC TROMETHAMINE 15 MG: 30 INJECTION, SOLUTION INTRAMUSCULAR; INTRAVENOUS at 18:00

## 2022-03-03 RX ADMIN — HYDROCODONE BITARTRATE AND ACETAMINOPHEN 1 TABLET: 5; 325 TABLET ORAL at 18:00

## 2022-03-03 RX ADMIN — Medication 10 ML: at 21:40

## 2022-03-03 RX ADMIN — WARFARIN SODIUM 4 MG: 2 TABLET ORAL at 22:29

## 2022-03-03 RX ADMIN — LORAZEPAM 0.5 MG: 2 INJECTION INTRAMUSCULAR; INTRAVENOUS at 12:34

## 2022-03-03 RX ADMIN — ACETAMINOPHEN 650 MG: 325 TABLET ORAL at 21:31

## 2022-03-03 ASSESSMENT — ENCOUNTER SYMPTOMS
NAUSEA: 1
CONSTIPATION: 0
VOMITING: 0
SHORTNESS OF BREATH: 0
ABDOMINAL PAIN: 1
DIARRHEA: 0

## 2022-03-03 ASSESSMENT — PAIN SCALES - GENERAL
PAINLEVEL_OUTOF10: 0
PAINLEVEL_OUTOF10: 8

## 2022-03-03 ASSESSMENT — PAIN DESCRIPTION - PAIN TYPE: TYPE: ACUTE PAIN

## 2022-03-03 ASSESSMENT — PAIN DESCRIPTION - ORIENTATION: ORIENTATION: RIGHT

## 2022-03-03 ASSESSMENT — PAIN DESCRIPTION - LOCATION: LOCATION: BACK

## 2022-03-03 NOTE — ED PROVIDER NOTES
I independently performed a history and physical on Mi Later. I personally saw the patient and performed a substantive portion of the visit including all aspects of the medical decision making. Briefly, this is a 78 y.o. female here for chief complaint of tremors, weakness, lightheadedness, chest tightness, abdominal pain    Abdominal pain is right upper quadrant. This is a chronic issue that intermittently comes and goes. She attributes it to her hernia. She takes hydrocodone for this. Tremors, weakness, lightheadedness on standing, chest tightness began 24 to 48 hours ago. She has history of hand tremors for the past few months however the tremor has now worsened. Her chest tightness is nonradiating. It is mild. It is worse with exertion. No shortness of breath. She has nausea. She cannot tolerate oral intake. Has been unable to take her heart medicines this morning. She thought A. fib or urinary tract infection may be the cause. On exam,   General: Patient is in no acute distress  Skin: No cyanosis  HEENT: Moist mucous membranes  Heart: Regular rate, regular rhythm  Lung: No respiratory distress  Abdomen: Soft, nontender  Neuro: Moving all extremities, no facial droop, no slurred speech, answers questions appropriately        EKG  The Ekg interpreted by me in the absence of a cardiologist shows. Normal sinus rhythm  No acute ST changes   HR 80  No significant EKG changes compared to study in 3/21  1st deg av block  Nonspecific t wave abnormality      Screenings   [place] Coma Scale  Eye Opening: Spontaneous  Best Verbal Response: Oriented  Best Motor Response: Obeys commands  [place] Coma Scale Score: 15        MDM  Briefly, this is a 78 y.o. female here for tremor, weakness, lightheadedness on standing, nausea, chest tightness, right upper quadrant abdominal pain. Differential broad. Patient takes hydrocodone.   No recent change to indicate withdrawal    Right upper quadrant pain may be due to her history of hernia. Seems chronic. Has been told in the past that she is not a surgical candidate. Not having symptoms of obstruction. May have pyelonephritis, obstructing urinary calculus, cholecystitis. Obtaining CT    Chest tightness may be secondary to ACS. Troponin negative. EKG shows no acute ischemic changes. Less likely pulmonary embolism given lack of tachycardia, tachypnea, hypoxia, unilateral DVT symptoms or pleuritic chest discomfort. Lab work shows creatinine elevation 0.4, may have WESLY. CT shows evidence of right-sided hydronephrosis. There may be a mass causing obstruction. Urology consulted for possible stenting. No evidence of UTI    Admitting patient for further work-up. She is too weak to ambulate. .         Patient Referrals:  No follow-up provider specified. Discharge Medications:  New Prescriptions    No medications on file       FINAL IMPRESSION  1. Tremor    2. Right sided abdominal pain    3. Chest pain, unspecified type    4. Nausea        Blood pressure (!) 168/84, pulse 81, temperature 98 °F (36.7 °C), temperature source Oral, resp. rate 16, height 5' 6\" (1.676 m), weight 200 lb (90.7 kg), SpO2 95 %. For further details of [facility] emergency department encounter, please see documentation by advanced practice providerJessica.         Salvador Leonard MD  03/03/22 Trevor Parry MD  03/03/22 2324
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
## 2022-03-03 NOTE — PROGRESS NOTES
Pt seen in  ED, admission completed. Pt is alert and oriented x 4. Pt lives at home alone, and is being admitted for right Hydronephrosis. Plan of care updated, all questions answered.

## 2022-03-03 NOTE — PROGRESS NOTES
Patient admitted to room /unit. resp easy no distress noted on o2 at 2l/nc. Ramos Corbett oriented to room and call light system, call light w/i reach.
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
## 2022-03-03 NOTE — ED PROVIDER NOTES
Triage EKG:    The 12 lead EKG was interpreted by me as follows:  Rate: normal with a rate of 80  Rhythm: sinus  Axis: normal  Intervals: first degree AVB, nonspecific IV block, normal QTc  ST segments: no ST elevations or depressions  T waves: no abnormal inversions  Non-specific T wave changes: present  Prior EKG comparison: EKG dated 3/9/21 is not significantly different        Hiral Brown MD  03/03/22 1133

## 2022-03-04 ENCOUNTER — ANESTHESIA EVENT (OUTPATIENT)
Dept: OPERATING ROOM | Age: 82
DRG: 657 | End: 2022-03-04
Payer: MEDICARE

## 2022-03-04 LAB
ALBUMIN SERPL-MCNC: 3.4 G/DL (ref 3.4–5)
ALP BLD-CCNC: 125 U/L (ref 40–129)
ALT SERPL-CCNC: 42 U/L (ref 10–40)
ANION GAP SERPL CALCULATED.3IONS-SCNC: 10 MMOL/L (ref 3–16)
AST SERPL-CCNC: 48 U/L (ref 15–37)
BILIRUB SERPL-MCNC: 0.5 MG/DL (ref 0–1)
BILIRUBIN DIRECT: <0.2 MG/DL (ref 0–0.3)
BILIRUBIN, INDIRECT: ABNORMAL MG/DL (ref 0–1)
BUN BLDV-MCNC: 14 MG/DL (ref 7–20)
CALCIUM SERPL-MCNC: 8.9 MG/DL (ref 8.3–10.6)
CHLORIDE BLD-SCNC: 101 MMOL/L (ref 99–110)
CO2: 25 MMOL/L (ref 21–32)
CREAT SERPL-MCNC: 1.2 MG/DL (ref 0.6–1.2)
GFR AFRICAN AMERICAN: 52
GFR NON-AFRICAN AMERICAN: 43
GLUCOSE BLD-MCNC: 90 MG/DL (ref 70–99)
HCT VFR BLD CALC: 37.3 % (ref 36–48)
HEMOGLOBIN: 12.4 G/DL (ref 12–16)
INR BLD: 3.3 (ref 0.88–1.12)
MAGNESIUM: 1.7 MG/DL (ref 1.8–2.4)
MCH RBC QN AUTO: 31.3 PG (ref 26–34)
MCHC RBC AUTO-ENTMCNC: 33.3 G/DL (ref 31–36)
MCV RBC AUTO: 94 FL (ref 80–100)
PDW BLD-RTO: 16.7 % (ref 12.4–15.4)
PLATELET # BLD: 287 K/UL (ref 135–450)
PMV BLD AUTO: 8 FL (ref 5–10.5)
POTASSIUM SERPL-SCNC: 4.6 MMOL/L (ref 3.5–5.1)
PROTHROMBIN TIME: 39.2 SEC (ref 9.9–12.7)
RBC # BLD: 3.97 M/UL (ref 4–5.2)
SARS-COV-2, NAAT: NOT DETECTED
SODIUM BLD-SCNC: 136 MMOL/L (ref 136–145)
TOTAL PROTEIN: 6.1 G/DL (ref 6.4–8.2)
WBC # BLD: 5.6 K/UL (ref 4–11)

## 2022-03-04 PROCEDURE — 6370000000 HC RX 637 (ALT 250 FOR IP): Performed by: INTERNAL MEDICINE

## 2022-03-04 PROCEDURE — 2580000003 HC RX 258: Performed by: INTERNAL MEDICINE

## 2022-03-04 PROCEDURE — 6360000002 HC RX W HCPCS: Performed by: INTERNAL MEDICINE

## 2022-03-04 PROCEDURE — 6370000000 HC RX 637 (ALT 250 FOR IP): Performed by: NURSE PRACTITIONER

## 2022-03-04 PROCEDURE — 6360000002 HC RX W HCPCS: Performed by: NURSE PRACTITIONER

## 2022-03-04 PROCEDURE — 1200000000 HC SEMI PRIVATE

## 2022-03-04 PROCEDURE — 80048 BASIC METABOLIC PNL TOTAL CA: CPT

## 2022-03-04 PROCEDURE — 83735 ASSAY OF MAGNESIUM: CPT

## 2022-03-04 PROCEDURE — 85027 COMPLETE CBC AUTOMATED: CPT

## 2022-03-04 PROCEDURE — 87635 SARS-COV-2 COVID-19 AMP PRB: CPT

## 2022-03-04 PROCEDURE — 80076 HEPATIC FUNCTION PANEL: CPT

## 2022-03-04 PROCEDURE — 85610 PROTHROMBIN TIME: CPT

## 2022-03-04 PROCEDURE — 36415 COLL VENOUS BLD VENIPUNCTURE: CPT

## 2022-03-04 RX ORDER — MAGNESIUM SULFATE IN WATER 40 MG/ML
2000 INJECTION, SOLUTION INTRAVENOUS ONCE
Status: COMPLETED | OUTPATIENT
Start: 2022-03-04 | End: 2022-03-04

## 2022-03-04 RX ORDER — WARFARIN SODIUM 2 MG/1
2 TABLET ORAL
Status: DISCONTINUED | OUTPATIENT
Start: 2022-03-05 | End: 2022-03-05

## 2022-03-04 RX ORDER — HYDROCODONE BITARTRATE AND ACETAMINOPHEN 10; 325 MG/1; MG/1
1 TABLET ORAL EVERY 4 HOURS PRN
Status: DISCONTINUED | OUTPATIENT
Start: 2022-03-04 | End: 2022-03-07 | Stop reason: HOSPADM

## 2022-03-04 RX ORDER — MORPHINE SULFATE 4 MG/ML
4 INJECTION, SOLUTION INTRAMUSCULAR; INTRAVENOUS
Status: DISCONTINUED | OUTPATIENT
Start: 2022-03-04 | End: 2022-03-07 | Stop reason: HOSPADM

## 2022-03-04 RX ORDER — DIAZEPAM 2 MG/1
2 TABLET ORAL EVERY 6 HOURS PRN
Status: DISCONTINUED | OUTPATIENT
Start: 2022-03-04 | End: 2022-03-07 | Stop reason: HOSPADM

## 2022-03-04 RX ORDER — HYDROCODONE BITARTRATE AND ACETAMINOPHEN 5; 325 MG/1; MG/1
1 TABLET ORAL EVERY 4 HOURS PRN
Status: DISCONTINUED | OUTPATIENT
Start: 2022-03-04 | End: 2022-03-07 | Stop reason: HOSPADM

## 2022-03-04 RX ORDER — VALSARTAN 160 MG/1
320 TABLET ORAL DAILY
Status: DISCONTINUED | OUTPATIENT
Start: 2022-03-05 | End: 2022-03-07 | Stop reason: HOSPADM

## 2022-03-04 RX ORDER — WARFARIN SODIUM 2 MG/1
4 TABLET ORAL
Status: DISCONTINUED | OUTPATIENT
Start: 2022-03-05 | End: 2022-03-05

## 2022-03-04 RX ADMIN — FLECAINIDE ACETATE 100 MG: 100 TABLET ORAL at 20:28

## 2022-03-04 RX ADMIN — FOLIC ACID 1 MG: 1 TABLET ORAL at 08:37

## 2022-03-04 RX ADMIN — CARVEDILOL 12.5 MG: 3.12 TABLET, FILM COATED ORAL at 15:55

## 2022-03-04 RX ADMIN — PAROXETINE HYDROCHLORIDE 40 MG: 20 TABLET, FILM COATED ORAL at 08:37

## 2022-03-04 RX ADMIN — MORPHINE SULFATE 4 MG: 4 INJECTION INTRAVENOUS at 23:26

## 2022-03-04 RX ADMIN — DIAZEPAM 2 MG: 2 TABLET ORAL at 12:52

## 2022-03-04 RX ADMIN — CARVEDILOL 12.5 MG: 3.12 TABLET, FILM COATED ORAL at 08:37

## 2022-03-04 RX ADMIN — HYDROCODONE BITARTRATE AND ACETAMINOPHEN 1 TABLET: 10; 325 TABLET ORAL at 05:49

## 2022-03-04 RX ADMIN — KETOROLAC TROMETHAMINE 15 MG: 30 INJECTION, SOLUTION INTRAMUSCULAR; INTRAVENOUS at 04:45

## 2022-03-04 RX ADMIN — METHIMAZOLE 5 MG: 5 TABLET ORAL at 20:27

## 2022-03-04 RX ADMIN — HYDROCODONE BITARTRATE AND ACETAMINOPHEN 1 TABLET: 10; 325 TABLET ORAL at 14:29

## 2022-03-04 RX ADMIN — ONDANSETRON 4 MG: 2 INJECTION INTRAMUSCULAR; INTRAVENOUS at 12:20

## 2022-03-04 RX ADMIN — HYDRALAZINE HYDROCHLORIDE 10 MG: 20 INJECTION INTRAMUSCULAR; INTRAVENOUS at 01:27

## 2022-03-04 RX ADMIN — MORPHINE SULFATE 4 MG: 4 INJECTION INTRAVENOUS at 12:51

## 2022-03-04 RX ADMIN — METHIMAZOLE 5 MG: 5 TABLET ORAL at 08:37

## 2022-03-04 RX ADMIN — MAGNESIUM SULFATE HEPTAHYDRATE 2000 MG: 40 INJECTION, SOLUTION INTRAVENOUS at 10:38

## 2022-03-04 RX ADMIN — METHIMAZOLE 5 MG: 5 TABLET ORAL at 12:52

## 2022-03-04 RX ADMIN — HYDROCODONE BITARTRATE AND ACETAMINOPHEN 1 TABLET: 10; 325 TABLET ORAL at 00:15

## 2022-03-04 RX ADMIN — ONDANSETRON 4 MG: 2 INJECTION INTRAMUSCULAR; INTRAVENOUS at 23:26

## 2022-03-04 RX ADMIN — MORPHINE SULFATE 4 MG: 4 INJECTION INTRAVENOUS at 18:42

## 2022-03-04 RX ADMIN — FLECAINIDE ACETATE 100 MG: 100 TABLET ORAL at 08:37

## 2022-03-04 RX ADMIN — HYDROCODONE BITARTRATE AND ACETAMINOPHEN 1 TABLET: 10; 325 TABLET ORAL at 09:34

## 2022-03-04 RX ADMIN — MORPHINE SULFATE 4 MG: 4 INJECTION INTRAVENOUS at 15:55

## 2022-03-04 RX ADMIN — HYDRALAZINE HYDROCHLORIDE 10 MG: 20 INJECTION INTRAMUSCULAR; INTRAVENOUS at 06:31

## 2022-03-04 RX ADMIN — DIAZEPAM 2 MG: 2 TABLET ORAL at 04:45

## 2022-03-04 RX ADMIN — Medication 10 ML: at 22:24

## 2022-03-04 RX ADMIN — HYDROCODONE BITARTRATE AND ACETAMINOPHEN 1 TABLET: 10; 325 TABLET ORAL at 20:28

## 2022-03-04 RX ADMIN — Medication 10 ML: at 08:41

## 2022-03-04 RX ADMIN — VALSARTAN 320 MG: 160 TABLET, FILM COATED ORAL at 08:37

## 2022-03-04 ASSESSMENT — PAIN DESCRIPTION - LOCATION
LOCATION: BACK
LOCATION: BACK
LOCATION: ABDOMEN
LOCATION: BACK
LOCATION: BACK

## 2022-03-04 ASSESSMENT — PAIN DESCRIPTION - PAIN TYPE
TYPE: ACUTE PAIN

## 2022-03-04 ASSESSMENT — PAIN SCALES - GENERAL
PAINLEVEL_OUTOF10: 10
PAINLEVEL_OUTOF10: 10
PAINLEVEL_OUTOF10: 8
PAINLEVEL_OUTOF10: 10
PAINLEVEL_OUTOF10: 8
PAINLEVEL_OUTOF10: 8
PAINLEVEL_OUTOF10: 9
PAINLEVEL_OUTOF10: 9
PAINLEVEL_OUTOF10: 0
PAINLEVEL_OUTOF10: 10
PAINLEVEL_OUTOF10: 8
PAINLEVEL_OUTOF10: 8
PAINLEVEL_OUTOF10: 0
PAINLEVEL_OUTOF10: 10
PAINLEVEL_OUTOF10: 10
PAINLEVEL_OUTOF10: 9
PAINLEVEL_OUTOF10: 9

## 2022-03-04 ASSESSMENT — PAIN DESCRIPTION - ORIENTATION: ORIENTATION: RIGHT

## 2022-03-04 NOTE — PLAN OF CARE
Problem: Falls - Risk of:  Goal: Will remain free from falls  Description: Will remain free from falls  3/4/2022 1142 by Dc To RN  Outcome: Ongoing  3/3/2022 2200 by Socorro Stevens RN  Outcome: Ongoing  Goal: Absence of physical injury  Description: Absence of physical injury  3/4/2022 1142 by cD To RN  Outcome: Ongoing  3/3/2022 2200 by Socorro Stevens RN  Outcome: Ongoing     Problem: Skin Integrity:  Goal: Will show no infection signs and symptoms  Description: Will show no infection signs and symptoms  3/4/2022 1142 by Dc To RN  Outcome: Ongoing  3/3/2022 2200 by Socorro Stveens RN  Outcome: Ongoing  Goal: Absence of new skin breakdown  Description: Absence of new skin breakdown  3/4/2022 1142 by Dc To RN  Outcome: Ongoing  3/3/2022 2200 by Socorro Stevens RN  Outcome: Ongoing     Problem: Infection:  Goal: Will remain free from infection  Description: Will remain free from infection  3/4/2022 1142 by Dc To RN  Outcome: Ongoing  3/3/2022 2200 by Socorro Stevens RN  Outcome: Ongoing     Problem: Safety:  Goal: Free from accidental physical injury  Description: Free from accidental physical injury  3/4/2022 1142 by Dc To RN  Outcome: Ongoing  3/3/2022 2200 by Socorro Stevens RN  Outcome: Ongoing  Goal: Free from intentional harm  Description: Free from intentional harm  3/4/2022 1142 by Dc To RN  Outcome: Ongoing  3/3/2022 2200 by Socorro Stevens RN  Outcome: Ongoing     Problem: Daily Care:  Goal: Daily care needs are met  Description: Daily care needs are met  3/4/2022 1142 by Dc To RN  Outcome: Ongoing  3/3/2022 2200 by Socorro Stevens RN  Outcome: Ongoing     Problem: Pain:  Goal: Patient's pain/discomfort is manageable  Description: Patient's pain/discomfort is manageable  3/4/2022 1142 by Dc To RN  Outcome: Ongoing  3/3/2022 2200 by Socorro Stevens RN  Outcome: Ongoing  Goal: Pain level will decrease  Description: Pain level will decrease  3/4/2022 1142 by Traci Lundberg RN  Outcome: Ongoing  3/3/2022 2200 by Sangeeta Ocampo RN  Outcome: Ongoing  Goal: Control of acute pain  Description: Control of acute pain  3/4/2022 1142 by Traci Lundberg RN  Outcome: Ongoing  3/3/2022 2200 by Sangeeta Ocampo RN  Outcome: Ongoing  Goal: Control of chronic pain  Description: Control of chronic pain  3/4/2022 1142 by Traci Lundberg RN  Outcome: Ongoing  3/3/2022 2200 by Sangeeta Ocampo RN  Outcome: Ongoing     Problem: Skin Integrity:  Goal: Skin integrity will stabilize  Description: Skin integrity will stabilize  3/4/2022 1142 by Traci Lundberg RN  Outcome: Ongoing  3/3/2022 2200 by Sangeeta Ocampo RN  Outcome: Ongoing     Problem: Discharge Planning:  Goal: Patients continuum of care needs are met  Description: Patients continuum of care needs are met  3/4/2022 1142 by Traci Lundberg RN  Outcome: Ongoing  3/3/2022 2200 by Sangeeta Ocampo RN  Outcome: Ongoing     Problem: Discharge Planning:  Goal: Discharged to appropriate level of care  Description: Discharged to appropriate level of care  3/4/2022 1142 by Traci Lundberg RN  Outcome: Ongoing  3/3/2022 2200 by Sangeeta Ocampo RN  Outcome: Ongoing

## 2022-03-04 NOTE — CARE COORDINATION
Discharge Planning Assessment  Risk of Readmission Score: 15%    RN discharge planner met with patient and son to discuss reason for admission, current living situation, and potential needs at the time of discharge. Demographics/Insurance verified Yes    Current type of dwelling: Single story home with no steps to enter    Patient from ECF/SW confirmed with: N/A    Living arrangements: Alone    Level of function/Support: The patient uses a walker or cane to ambulate. The patient is able to dress and bathe herself. The patient does need some assistance which the family provides. PCP: Mel Lloyd    Last Visit to PCP: 2 months ago    DME: walker, cane, shower seat, grab bars in the shower    Active with any community resources/agencies/skilled home care:    Has used [facility] in the past and would like to use them in the future if needed. Medication compliance issues: The patient is compliant with her medications.     Financial issues that could impact healthcare: none     Tentative discharge plan: Need PT/OT orders placed, home vs SNF vs ARU    - If SNF is needed, place referral to [facility]    Transportation at the time of discharge: IQRA ZhaoN RN      Phone: [phone]
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
## 2022-03-04 NOTE — PROGRESS NOTES
Pharmacy to Dose Warfarin    Pharmacy consulted to dose warfarin for afib. INR Goal: 2-3    INR today: 3.3    Assessment/Plan:  - INR trending up significantly from yesterday (2.8>>3.3). - Will hold dose of warfarin tonight. Patient received home dose of 4 mg last night. Pharmacy will continue to follow.     Carolann Perez, PharmD, [facility]

## 2022-03-04 NOTE — PROGRESS NOTES
[facility] PROGRESS NOTE    3/4/2022 8:16 AM        Name: Alejandro Jones . Admitted: 3/3/2022  Primary Care Provider: Nancy Horne (Tel: [phone])      Subjective:  . Admitted with lower abdominal pain which has been present for weeks. Noted to have right renal mass with hydronephrosis     Seen with grand daughter at the bedside  Pt reports headache, nausea and left sided abd pain.     Reports pain also occasionally present over the RLQ    Reviewed interval ancillary notes    Current Medications  [START ON 3/5/2022] warfarin (COUMADIN) tablet 2 mg, Once per day on Mon Wed Fri   And  [START ON 3/5/2022] warfarin (COUMADIN) tablet 4 mg, Once per day on Sun Tue Thu Sat  magnesium sulfate 2000 mg in 50 mL IVPB premix, Once  sodium chloride flush 0.9 % injection 5-40 mL, 2 times per day  sodium chloride flush 0.9 % injection 5-40 mL, PRN  0.9 % sodium chloride infusion, PRN  ondansetron (ZOFRAN-ODT) disintegrating tablet 4 mg, Q8H PRN   Or  ondansetron (ZOFRAN) injection 4 mg, Q6H PRN  polyethylene glycol (GLYCOLAX) packet 17 g, Daily PRN  acetaminophen (TYLENOL) tablet 650 mg, Q6H PRN   Or  acetaminophen (TYLENOL) suppository 650 mg, Q6H PRN  HYDROcodone-acetaminophen (NORCO) 5-325 MG per tablet 1 tablet, Q6H PRN  flecainide (TAMBOCOR) tablet 100 mg, BID  HYDROcodone-acetaminophen (NORCO)  MG per tablet 1 tablet, Q6H PRN  diazePAM (VALIUM) tablet 2 mg, BID PRN  PARoxetine (PAXIL) tablet 40 mg, QAM  valsartan (DIOVAN) tablet 320 mg, Daily  carvedilol (COREG) tablet 22.0 mg, BID WC  folic acid (FOLVITE) tablet 1 mg, Daily  methIMAzole (TAPAZOLE) tablet 5 mg, TID  therapeutic multivitamin-minerals 1 tablet, Daily  hydrALAZINE (APRESOLINE) injection 10 mg, Q4H PRN        Objective:  BP (!) 160/82   Pulse 70   Temp 98.6 °F (37 °C) (Oral)   Resp 20   Ht 5' 6\" (1.676 m)   Wt 200 lb (90.7 kg)   SpO2 94%   BMI 32.28 kg/m²     Intake/Output Summary (Last 24 hours) at 3/4/2022 0816  Last data filed at 3/4/2022 0615  Gross per 24 hour   Intake    Output 600 ml   Net -600 ml      Wt Readings from Last 3 Encounters:   03/03/22 200 lb (90.7 kg)   03/09/21 214 lb (97.1 kg)   11/06/20 210 lb (95.3 kg)       General appearance:  Appears uncomfortable, tearful and anxious   Eyes: Sclera clear. Pupils equal.  ENT: Moist oral mucosa. Trachea midline, no adenopathy. Cardiovascular: Regular rhythm, normal S1, S2. No murmur. No edema in lower extremities  Respiratory: Not using accessory muscles. Decreased breath sounds in bases. No wheezing   GI: Abdomen soft, no current guarding or rebound. Bowel sounds noted. Lower abdominal pain noted   Musculoskeletal: No cyanosis in digits, neck supple  Neurology: CN 2-12 grossly intact. No speech or motor deficits  Psych: anxious  affect. Alert and oriented in time, place and person  Skin: Warm, dry, normal turgor    Labs and Tests:  CBC:   Recent Labs     03/03/22  1135 03/04/22  0536   WBC 4.6 5.6   HGB 12.9 12.4    287     BMP:    Recent Labs     03/03/22  1135 03/04/22  0536    136   K 3.5 4.6   CL 97* 101   CO2 26 25   BUN 17 14   CREATININE 1.1 1.2   GLUCOSE 122* 90     Hepatic:   Recent Labs     03/03/22  1135 03/04/22  0536   AST 41* 48*   ALT 48* 42*   BILITOT 0.4 0.5   ALKPHOS 144* 125     CT head:  No hemorrhage or mass     Atrophy and small-vessel ischemic change     Trace paranasal sinus disease       CT abd:    Severe right-sided hydronephrosis noted. Hyperdense material is seen in the   proximal right ureter, at the right UPJ. This hyperdense material could   represent blood clot, tumor, or proteinaceous debris. Forrestine Ozuna is seen in the   right kidney     Cholelithiasis and nonobstructing left renal calculi. Partially calcified nodule is seen posteriorly in the right hepatic lobe.    This is incompletely characterized.  Correlate with any history of known primary. [place] non urgent follow-up abdominal MRI or CT scan, abdomen   with and without IV contrast for further characterization     Right paramidline hernia containing fat omentum and bowel.  No resultant   bowel obstruction.  There is diverticulosis.  No diverticulitis     Problem List  Principal Problem:    Hydronephrosis, right  Active Problems:    Chronic a-fib (HCC)    HTN (hypertension)    Renal mass, right    Elevated LFTs    RA (rheumatoid arthritis) ([address]  Resolved Problems:    * No resolved hospital problems. *       Assessment & Plan:   1. Right renal mass/  Hydronephrosis:  She has been evaluated by urology. Anticipate cysto with stent placement in the am  2. Uncontrolled Pain: I added prn IV morphine. Pt takes Noroc 10 mg tabs at home q 4 hours. She is very tolerant to narcotics   3. Chronic AF:  Rate controlled on BB and flecainide. She is AC with coumadin ( INR 3.3 , will hold dose today)   4. HTN:  Likely due to pain, anxiety. Will give hs ARB now and follow closely. Prn hydralazine available    5. Low Magnesium; will give IV replacement   6. Ok to eat today.   NPO after MN for surgery Saturday at 10 am       Diet: Diet NPO Exceptions are: Sips of Water with Meds  Code:Full Code  DVT PPX      LAUREN Pardo CNP   3/4/2022 8:16 AM

## 2022-03-04 NOTE — CONSULTS
Clinical Pharmacy Note: Pharmacy to Dose Warfarin    Pharmacy consulted by Dr. Amy Stalpes to dose warfarin. Kristian Sanchez is a 78 y.o. female  is receiving warfarin for indication: AF. INR Goal Range: 2-3  Prior to admission warfarin dosing regimen: 2mg M/W/F, 4mg AOD   INR today:   Lab Results   Component Value Date    INR 2.84 03/03/2022       Assessment/Plan:  INR is therapeutic on prior to admission dosing regimen. Possible concomitant drug-drug interactions include: NA  Based on today's assessment, dose warfarin resume home dose starting tonight as patient has not yet received today's dose. Daily INR is ordered. Pharmacy will continue to monitor and make adjustments to regimen as necessary.      Thank you for the consult,     Shavonne Looney, AmadeoD

## 2022-03-05 ENCOUNTER — ANESTHESIA (OUTPATIENT)
Dept: OPERATING ROOM | Age: 82
DRG: 657 | End: 2022-03-05
Payer: MEDICARE

## 2022-03-05 ENCOUNTER — APPOINTMENT (OUTPATIENT)
Dept: GENERAL RADIOLOGY | Age: 82
DRG: 657 | End: 2022-03-05
Payer: MEDICARE

## 2022-03-05 VITALS
SYSTOLIC BLOOD PRESSURE: 72 MMHG | DIASTOLIC BLOOD PRESSURE: 42 MMHG | OXYGEN SATURATION: 100 % | RESPIRATION RATE: 1 BRPM

## 2022-03-05 LAB
ANION GAP SERPL CALCULATED.3IONS-SCNC: 15 MMOL/L (ref 3–16)
BUN BLDV-MCNC: 23 MG/DL (ref 7–20)
CALCIUM SERPL-MCNC: 9.3 MG/DL (ref 8.3–10.6)
CHLORIDE BLD-SCNC: 100 MMOL/L (ref 99–110)
CO2: 25 MMOL/L (ref 21–32)
CREAT SERPL-MCNC: 1.3 MG/DL (ref 0.6–1.2)
GFR AFRICAN AMERICAN: 47
GFR NON-AFRICAN AMERICAN: 39
GLUCOSE BLD-MCNC: 95 MG/DL (ref 70–99)
HCT VFR BLD CALC: 38.9 % (ref 36–48)
HEMOGLOBIN: 12.9 G/DL (ref 12–16)
INR BLD: 4.26 (ref 0.88–1.12)
MAGNESIUM: 2.1 MG/DL (ref 1.8–2.4)
MCH RBC QN AUTO: 31.4 PG (ref 26–34)
MCHC RBC AUTO-ENTMCNC: 33.1 G/DL (ref 31–36)
MCV RBC AUTO: 94.7 FL (ref 80–100)
PDW BLD-RTO: 16.8 % (ref 12.4–15.4)
PLATELET # BLD: 304 K/UL (ref 135–450)
PMV BLD AUTO: 7.7 FL (ref 5–10.5)
POTASSIUM SERPL-SCNC: 3.8 MMOL/L (ref 3.5–5.1)
PROTHROMBIN TIME: 51.1 SEC (ref 9.9–12.7)
RBC # BLD: 4.1 M/UL (ref 4–5.2)
SODIUM BLD-SCNC: 140 MMOL/L (ref 136–145)
WBC # BLD: 6.7 K/UL (ref 4–11)

## 2022-03-05 PROCEDURE — 1200000000 HC SEMI PRIVATE

## 2022-03-05 PROCEDURE — 3700000001 HC ADD 15 MINUTES (ANESTHESIA): Performed by: UROLOGY

## 2022-03-05 PROCEDURE — BT1D1ZZ FLUOROSCOPY OF RIGHT KIDNEY, URETER AND BLADDER USING LOW OSMOLAR CONTRAST: ICD-10-PCS | Performed by: UROLOGY

## 2022-03-05 PROCEDURE — 3700000000 HC ANESTHESIA ATTENDED CARE: Performed by: UROLOGY

## 2022-03-05 PROCEDURE — 74420 UROGRAPHY RTRGR +-KUB: CPT

## 2022-03-05 PROCEDURE — 94760 N-INVAS EAR/PLS OXIMETRY 1: CPT

## 2022-03-05 PROCEDURE — 80048 BASIC METABOLIC PNL TOTAL CA: CPT

## 2022-03-05 PROCEDURE — 6360000002 HC RX W HCPCS: Performed by: INTERNAL MEDICINE

## 2022-03-05 PROCEDURE — C1758 CATHETER, URETERAL: HCPCS | Performed by: UROLOGY

## 2022-03-05 PROCEDURE — 2700000000 HC OXYGEN THERAPY PER DAY

## 2022-03-05 PROCEDURE — 3600000004 HC SURGERY LEVEL 4 BASE: Performed by: UROLOGY

## 2022-03-05 PROCEDURE — 94761 N-INVAS EAR/PLS OXIMETRY MLT: CPT

## 2022-03-05 PROCEDURE — 2500000003 HC RX 250 WO HCPCS: Performed by: ANESTHESIOLOGY

## 2022-03-05 PROCEDURE — 6370000000 HC RX 637 (ALT 250 FOR IP): Performed by: UROLOGY

## 2022-03-05 PROCEDURE — 6360000002 HC RX W HCPCS: Performed by: ANESTHESIOLOGY

## 2022-03-05 PROCEDURE — C2617 STENT, NON-COR, TEM W/O DEL: HCPCS | Performed by: UROLOGY

## 2022-03-05 PROCEDURE — 3600000014 HC SURGERY LEVEL 4 ADDTL 15MIN: Performed by: UROLOGY

## 2022-03-05 PROCEDURE — 85610 PROTHROMBIN TIME: CPT

## 2022-03-05 PROCEDURE — 6370000000 HC RX 637 (ALT 250 FOR IP): Performed by: INTERNAL MEDICINE

## 2022-03-05 PROCEDURE — 2580000003 HC RX 258: Performed by: UROLOGY

## 2022-03-05 PROCEDURE — 36415 COLL VENOUS BLD VENIPUNCTURE: CPT

## 2022-03-05 PROCEDURE — 83735 ASSAY OF MAGNESIUM: CPT

## 2022-03-05 PROCEDURE — 6360000002 HC RX W HCPCS: Performed by: UROLOGY

## 2022-03-05 PROCEDURE — C1769 GUIDE WIRE: HCPCS | Performed by: UROLOGY

## 2022-03-05 PROCEDURE — 7100000001 HC PACU RECOVERY - ADDTL 15 MIN: Performed by: UROLOGY

## 2022-03-05 PROCEDURE — 7100000000 HC PACU RECOVERY - FIRST 15 MIN: Performed by: UROLOGY

## 2022-03-05 PROCEDURE — 6360000004 HC RX CONTRAST MEDICATION: Performed by: UROLOGY

## 2022-03-05 PROCEDURE — 6370000000 HC RX 637 (ALT 250 FOR IP): Performed by: NURSE PRACTITIONER

## 2022-03-05 PROCEDURE — 0T768DZ DILATION OF RIGHT URETER WITH INTRALUMINAL DEVICE, VIA NATURAL OR ARTIFICIAL OPENING ENDOSCOPIC: ICD-10-PCS | Performed by: UROLOGY

## 2022-03-05 PROCEDURE — 2580000003 HC RX 258: Performed by: INTERNAL MEDICINE

## 2022-03-05 PROCEDURE — 2709999900 HC NON-CHARGEABLE SUPPLY: Performed by: UROLOGY

## 2022-03-05 PROCEDURE — 85027 COMPLETE CBC AUTOMATED: CPT

## 2022-03-05 DEVICE — STENT URET 6FR L26CM PERCFLX HYDR+ TAPR TIP GRAD: Type: IMPLANTABLE DEVICE | Site: URETER | Status: FUNCTIONAL

## 2022-03-05 RX ORDER — HYDROMORPHONE HCL 110MG/55ML
0.25 PATIENT CONTROLLED ANALGESIA SYRINGE INTRAVENOUS EVERY 5 MIN PRN
Status: DISCONTINUED | OUTPATIENT
Start: 2022-03-05 | End: 2022-03-05 | Stop reason: HOSPADM

## 2022-03-05 RX ORDER — HYDROMORPHONE HCL 110MG/55ML
0.5 PATIENT CONTROLLED ANALGESIA SYRINGE INTRAVENOUS EVERY 5 MIN PRN
Status: DISCONTINUED | OUTPATIENT
Start: 2022-03-05 | End: 2022-03-05 | Stop reason: HOSPADM

## 2022-03-05 RX ORDER — MAGNESIUM HYDROXIDE 1200 MG/15ML
LIQUID ORAL
Status: COMPLETED | OUTPATIENT
Start: 2022-03-05 | End: 2022-03-05

## 2022-03-05 RX ORDER — SODIUM CHLORIDE 0.9 % (FLUSH) 0.9 %
5-40 SYRINGE (ML) INJECTION EVERY 12 HOURS SCHEDULED
Status: DISCONTINUED | OUTPATIENT
Start: 2022-03-05 | End: 2022-03-05 | Stop reason: HOSPADM

## 2022-03-05 RX ORDER — CEFAZOLIN SODIUM 1 G/3ML
INJECTION, POWDER, FOR SOLUTION INTRAMUSCULAR; INTRAVENOUS PRN
Status: DISCONTINUED | OUTPATIENT
Start: 2022-03-05 | End: 2022-03-05 | Stop reason: SDUPTHER

## 2022-03-05 RX ORDER — DEXAMETHASONE SODIUM PHOSPHATE 4 MG/ML
INJECTION, SOLUTION INTRA-ARTICULAR; INTRALESIONAL; INTRAMUSCULAR; INTRAVENOUS; SOFT TISSUE PRN
Status: DISCONTINUED | OUTPATIENT
Start: 2022-03-05 | End: 2022-03-05 | Stop reason: SDUPTHER

## 2022-03-05 RX ORDER — SODIUM CHLORIDE 9 MG/ML
25 INJECTION, SOLUTION INTRAVENOUS PRN
Status: DISCONTINUED | OUTPATIENT
Start: 2022-03-05 | End: 2022-03-05 | Stop reason: HOSPADM

## 2022-03-05 RX ORDER — LIDOCAINE HYDROCHLORIDE 20 MG/ML
INJECTION, SOLUTION EPIDURAL; INFILTRATION; INTRACAUDAL; PERINEURAL PRN
Status: DISCONTINUED | OUTPATIENT
Start: 2022-03-05 | End: 2022-03-05 | Stop reason: SDUPTHER

## 2022-03-05 RX ORDER — ONDANSETRON 2 MG/ML
4 INJECTION INTRAMUSCULAR; INTRAVENOUS
Status: DISCONTINUED | OUTPATIENT
Start: 2022-03-05 | End: 2022-03-05 | Stop reason: HOSPADM

## 2022-03-05 RX ORDER — ONDANSETRON 2 MG/ML
INJECTION INTRAMUSCULAR; INTRAVENOUS PRN
Status: DISCONTINUED | OUTPATIENT
Start: 2022-03-05 | End: 2022-03-05 | Stop reason: SDUPTHER

## 2022-03-05 RX ORDER — SODIUM CHLORIDE 0.9 % (FLUSH) 0.9 %
5-40 SYRINGE (ML) INJECTION PRN
Status: DISCONTINUED | OUTPATIENT
Start: 2022-03-05 | End: 2022-03-05 | Stop reason: HOSPADM

## 2022-03-05 RX ORDER — MEPERIDINE HYDROCHLORIDE 25 MG/ML
12.5 INJECTION INTRAMUSCULAR; INTRAVENOUS; SUBCUTANEOUS EVERY 5 MIN PRN
Status: DISCONTINUED | OUTPATIENT
Start: 2022-03-05 | End: 2022-03-05 | Stop reason: HOSPADM

## 2022-03-05 RX ORDER — DIPHENHYDRAMINE HYDROCHLORIDE 50 MG/ML
12.5 INJECTION INTRAMUSCULAR; INTRAVENOUS
Status: DISCONTINUED | OUTPATIENT
Start: 2022-03-05 | End: 2022-03-05 | Stop reason: HOSPADM

## 2022-03-05 RX ORDER — PROPOFOL 10 MG/ML
INJECTION, EMULSION INTRAVENOUS PRN
Status: DISCONTINUED | OUTPATIENT
Start: 2022-03-05 | End: 2022-03-05 | Stop reason: SDUPTHER

## 2022-03-05 RX ORDER — FENTANYL CITRATE 50 UG/ML
INJECTION, SOLUTION INTRAMUSCULAR; INTRAVENOUS PRN
Status: DISCONTINUED | OUTPATIENT
Start: 2022-03-05 | End: 2022-03-05 | Stop reason: SDUPTHER

## 2022-03-05 RX ADMIN — FENTANYL CITRATE 50 MCG: 50 INJECTION, SOLUTION INTRAMUSCULAR; INTRAVENOUS at 11:13

## 2022-03-05 RX ADMIN — CARVEDILOL 12.5 MG: 3.12 TABLET, FILM COATED ORAL at 15:26

## 2022-03-05 RX ADMIN — METHIMAZOLE 5 MG: 5 TABLET ORAL at 20:30

## 2022-03-05 RX ADMIN — PROPOFOL 100 MG: 10 INJECTION, EMULSION INTRAVENOUS at 11:06

## 2022-03-05 RX ADMIN — Medication 10 ML: at 08:31

## 2022-03-05 RX ADMIN — HYDROCODONE BITARTRATE AND ACETAMINOPHEN 1 TABLET: 10; 325 TABLET ORAL at 22:48

## 2022-03-05 RX ADMIN — ACETAMINOPHEN 650 MG: 325 TABLET ORAL at 20:30

## 2022-03-05 RX ADMIN — CEFAZOLIN 2000 MG: 1 INJECTION, POWDER, FOR SOLUTION INTRAVENOUS at 11:02

## 2022-03-05 RX ADMIN — HYDROCODONE BITARTRATE AND ACETAMINOPHEN 1 TABLET: 10; 325 TABLET ORAL at 18:53

## 2022-03-05 RX ADMIN — FENTANYL CITRATE 50 MCG: 50 INJECTION, SOLUTION INTRAMUSCULAR; INTRAVENOUS at 11:06

## 2022-03-05 RX ADMIN — MORPHINE SULFATE 4 MG: 4 INJECTION INTRAVENOUS at 20:30

## 2022-03-05 RX ADMIN — LIDOCAINE HYDROCHLORIDE 100 MG: 20 INJECTION, SOLUTION EPIDURAL; INFILTRATION; INTRACAUDAL; PERINEURAL at 11:03

## 2022-03-05 RX ADMIN — HYDROCODONE BITARTRATE AND ACETAMINOPHEN 1 TABLET: 10; 325 TABLET ORAL at 08:34

## 2022-03-05 RX ADMIN — MORPHINE SULFATE 4 MG: 4 INJECTION INTRAVENOUS at 13:04

## 2022-03-05 RX ADMIN — ONDANSETRON 4 MG: 2 INJECTION INTRAMUSCULAR; INTRAVENOUS at 11:13

## 2022-03-05 RX ADMIN — FLECAINIDE ACETATE 100 MG: 100 TABLET ORAL at 20:30

## 2022-03-05 RX ADMIN — FLECAINIDE ACETATE 100 MG: 100 TABLET ORAL at 08:31

## 2022-03-05 RX ADMIN — DIAZEPAM 2 MG: 2 TABLET ORAL at 04:46

## 2022-03-05 RX ADMIN — Medication 10 ML: at 20:39

## 2022-03-05 RX ADMIN — HYDROCODONE BITARTRATE AND ACETAMINOPHEN 1 TABLET: 10; 325 TABLET ORAL at 15:26

## 2022-03-05 RX ADMIN — VALSARTAN 320 MG: 160 TABLET, FILM COATED ORAL at 08:31

## 2022-03-05 RX ADMIN — DEXAMETHASONE SODIUM PHOSPHATE 4 MG: 4 INJECTION, SOLUTION INTRAMUSCULAR; INTRAVENOUS at 11:13

## 2022-03-05 RX ADMIN — HYDROCODONE BITARTRATE AND ACETAMINOPHEN 1 TABLET: 10; 325 TABLET ORAL at 04:45

## 2022-03-05 RX ADMIN — CARVEDILOL 12.5 MG: 3.12 TABLET, FILM COATED ORAL at 08:31

## 2022-03-05 RX ADMIN — HYDRALAZINE HYDROCHLORIDE 10 MG: 20 INJECTION INTRAMUSCULAR; INTRAVENOUS at 04:46

## 2022-03-05 ASSESSMENT — PULMONARY FUNCTION TESTS
PIF_VALUE: 2
PIF_VALUE: 1
PIF_VALUE: 1
PIF_VALUE: 16
PIF_VALUE: 3
PIF_VALUE: 24
PIF_VALUE: 1
PIF_VALUE: 22
PIF_VALUE: 2
PIF_VALUE: 3
PIF_VALUE: 1
PIF_VALUE: 1
PIF_VALUE: 14
PIF_VALUE: 0
PIF_VALUE: 3
PIF_VALUE: 2
PIF_VALUE: 1
PIF_VALUE: 2
PIF_VALUE: 3
PIF_VALUE: 3
PIF_VALUE: 1
PIF_VALUE: 11
PIF_VALUE: 23
PIF_VALUE: 3
PIF_VALUE: 25

## 2022-03-05 ASSESSMENT — PAIN SCALES - GENERAL
PAINLEVEL_OUTOF10: 0
PAINLEVEL_OUTOF10: 8
PAINLEVEL_OUTOF10: 10
PAINLEVEL_OUTOF10: 8
PAINLEVEL_OUTOF10: 0
PAINLEVEL_OUTOF10: 2
PAINLEVEL_OUTOF10: 0
PAINLEVEL_OUTOF10: 8
PAINLEVEL_OUTOF10: 8
PAINLEVEL_OUTOF10: 0
PAINLEVEL_OUTOF10: 10
PAINLEVEL_OUTOF10: 8
PAINLEVEL_OUTOF10: 2
PAINLEVEL_OUTOF10: 10
PAINLEVEL_OUTOF10: 2
PAINLEVEL_OUTOF10: 7

## 2022-03-05 ASSESSMENT — PAIN DESCRIPTION - LOCATION
LOCATION: ABDOMEN
LOCATION: ABDOMEN

## 2022-03-05 ASSESSMENT — PAIN DESCRIPTION - PAIN TYPE
TYPE: ACUTE PAIN
TYPE: ACUTE PAIN

## 2022-03-05 ASSESSMENT — PAIN DESCRIPTION - ORIENTATION: ORIENTATION: RIGHT

## 2022-03-05 NOTE — OP NOTE
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  Item Serial No.  Lot No. LRB No. Used Action   STENT URET 6FR L26CM PERCFLX HYDR+  Duos [id number]  [facility] 6FR L26CM PERCFLX HYDR+ TAPR TIP GRAD  [employer] Count includes the [facility] UROLOGY- [id number] Right 1 Implanted         Drains: * No LDAs found *    Findings: prob prox right ureteral tumor    Detailed Description of Procedure:   See dictated op note    Electronically signed by Todd Morrell MD on 3/5/2022 at 11:26 AM

## 2022-03-05 NOTE — PLAN OF CARE
Problem: Falls - Risk of:  Goal: Will remain free from falls  Description: Will remain free from falls  3/5/2022 0851 by Favian Mccabe RN  Outcome: Ongoing  3/4/2022 2153 by Dante Alarcon RN  Outcome: Ongoing  Goal: Absence of physical injury  Description: Absence of physical injury  3/5/2022 0851 by Favian Mccabe RN  Outcome: Ongoing  3/4/2022 2153 by Dante Alarcon RN  Outcome: Ongoing     Problem: Skin Integrity:  Goal: Will show no infection signs and symptoms  Description: Will show no infection signs and symptoms  3/5/2022 0851 by Favian Mccabe RN  Outcome: Ongoing  3/4/2022 2153 by Dante Alarcon RN  Outcome: Ongoing  Goal: Absence of new skin breakdown  Description: Absence of new skin breakdown  3/5/2022 0851 by Favian Mccabe RN  Outcome: Ongoing  3/4/2022 2153 by Dante Alarcon RN  Outcome: Ongoing     Problem: Infection:  Goal: Will remain free from infection  Description: Will remain free from infection  3/5/2022 0851 by Favian Mccabe RN  Outcome: Ongoing  3/4/2022 2153 by Dante Alarcon RN  Outcome: Ongoing     Problem: Safety:  Goal: Free from accidental physical injury  Description: Free from accidental physical injury  3/5/2022 0851 by Favian Mccabe RN  Outcome: Ongoing  3/4/2022 2153 by Dante Alarcon RN  Outcome: Ongoing  Goal: Free from intentional harm  Description: Free from intentional harm  3/5/2022 0851 by Favian Mccabe RN  Outcome: Ongoing  3/4/2022 2153 by Dante Alarcon RN  Outcome: Ongoing     Problem: Daily Care:  Goal: Daily care needs are met  Description: Daily care needs are met  3/5/2022 0851 by Favian Mccabe RN  Outcome: Ongoing  3/4/2022 2153 by Dante Alarcon RN  Outcome: Ongoing     Problem: Pain:  Goal: Patient's pain/discomfort is manageable  Description: Patient's pain/discomfort is manageable  3/5/2022 0851 by Favian Mccabe RN  Outcome: Ongoing  3/4/2022 2153 by Dante Alarcon RN  Outcome: Ongoing  Goal: Pain level will decrease  Description: Pain level will decrease  3/5/2022 0851 by Lucrezia Favre, RN  Outcome: Ongoing  3/4/2022 2153 by Everardo Munoz RN  Outcome: Ongoing  Goal: Control of acute pain  Description: Control of acute pain  3/5/2022 0851 by Lucrezia Favre, RN  Outcome: Ongoing  3/4/2022 2153 by Everardo Munoz RN  Outcome: Ongoing  Goal: Control of chronic pain  Description: Control of chronic pain  3/5/2022 0851 by Lucrezia Favre, RN  Outcome: Ongoing  3/4/2022 2153 by Everardo Munoz RN  Outcome: Ongoing     Problem: Skin Integrity:  Goal: Skin integrity will stabilize  Description: Skin integrity will stabilize  3/5/2022 0851 by Lucrezia Favre, RN  Outcome: Ongoing  3/4/2022 2153 by Everardo Munoz RN  Outcome: Ongoing     Problem: Discharge Planning:  Goal: Patients continuum of care needs are met  Description: Patients continuum of care needs are met  3/5/2022 0851 by Lucrezia Favre, RN  Outcome: Ongoing  3/4/2022 2153 by Everardo Munoz RN  Outcome: Ongoing     Problem: Discharge Planning:  Goal: Discharged to appropriate level of care  Description: Discharged to appropriate level of care  3/5/2022 0851 by Lucrezia Favre, RN  Outcome: Ongoing  3/4/2022 2153 by Everardo Munoz RN  Outcome: Ongoing

## 2022-03-05 NOTE — PROGRESS NOTES
Pharmacy to Dose Warfarin    Pharmacy consulted to dose warfarin for Afib. INR Goal: 2-3    INR today: 4.26    Assessment/Plan:  - INR supratherapeutic today  - Will hold warfarin tonight  - Daily INR ordered    Pharmacy will continue to follow.     Lakia Cason, PharmD, [facility]  Clinical Pharmacist  [id number]

## 2022-03-05 NOTE — PROGRESS NOTES
79 y/o female with abd pain with nausea. Has history of right hydro and had stent placed and later removed several months ago. Ct with severe right hydro with poss soft tissue in the prox right ureter    Pain is bilateral.   Creatinine 1.2 today and no sign of infection  Discussed findings and options and will proceed with cysto and right rgp and poss right stent.    Pt marked and will get ancef pre op

## 2022-03-05 NOTE — ANESTHESIA POSTPROCEDURE EVALUATION
Department of Anesthesiology  Postprocedure Note    Patient: Ronald Palacios  MRN: [id number]  YOB: 1940  Date of evaluation: 3/5/2022  Time:  11:38 AM     Procedure Summary     Date: 03/05/22 Room / Location: [address] [place] [address]    Anesthesia Start: 1101 Anesthesia Stop: 1138    Procedure: [address], RIGHT STENT INSERTION (Right ) Diagnosis: (Hydronephrosis, right, renal mass)    Surgeons: Rupesh Morrow MD Responsible Provider: Paulette Llanos MD    Anesthesia Type: general ASA Status: 2          Anesthesia Type: general    Genna Phase I: Genna Score: 9    Genna Phase II:      Last vitals: Reviewed and per EMR flowsheets.        Anesthesia Post Evaluation    Patient location during evaluation: PACU  Patient participation: complete - patient participated  Level of consciousness: awake and alert  Pain score: 2  Airway patency: patent  Nausea & Vomiting: no vomiting  Complications: no  Cardiovascular status: blood pressure returned to baseline  Respiratory status: acceptable  Hydration status: euvolemic  Multimodal analgesia pain management approach

## 2022-03-05 NOTE — PLAN OF CARE
Problem: Falls - Risk of:  Goal: Will remain free from falls  Description: Will remain free from falls  3/4/2022 2153 by Georgina Martin RN  Outcome: Ongoing  3/4/2022 1142 by Og Bills RN  Outcome: Ongoing  Goal: Absence of physical injury  Description: Absence of physical injury  3/4/2022 2153 by Georgina Martin RN  Outcome: Ongoing  3/4/2022 1142 by Og Bills RN  Outcome: Ongoing     Problem: Skin Integrity:  Goal: Will show no infection signs and symptoms  Description: Will show no infection signs and symptoms  3/4/2022 2153 by Georgina Martin RN  Outcome: Ongoing  3/4/2022 1142 by Og Bills RN  Outcome: Ongoing  Goal: Absence of new skin breakdown  Description: Absence of new skin breakdown  3/4/2022 2153 by Georgina Martin RN  Outcome: Ongoing  3/4/2022 1142 by Og Bills RN  Outcome: Ongoing     Problem: Infection:  Goal: Will remain free from infection  Description: Will remain free from infection  3/4/2022 2153 by Georgina Martin RN  Outcome: Ongoing  3/4/2022 1142 by Og Bills RN  Outcome: Ongoing     Problem: Safety:  Goal: Free from accidental physical injury  Description: Free from accidental physical injury  3/4/2022 2153 by Georgina Martin RN  Outcome: Ongoing  3/4/2022 1142 by Og Bills RN  Outcome: Ongoing  Goal: Free from intentional harm  Description: Free from intentional harm  3/4/2022 2153 by Georgina Martin RN  Outcome: Ongoing  3/4/2022 1142 by Og Bills RN  Outcome: Ongoing     Problem: Daily Care:  Goal: Daily care needs are met  Description: Daily care needs are met  3/4/2022 2153 by Georgina Martin RN  Outcome: Ongoing  3/4/2022 1142 by Og Bills RN  Outcome: Ongoing     Problem: Pain:  Goal: Patient's pain/discomfort is manageable  Description: Patient's pain/discomfort is manageable  3/4/2022 2153 by Georgina Martin RN  Outcome: Ongoing  3/4/2022 1142 by Og Bills RN  Outcome: Ongoing  Goal: Pain level will decrease  Description: Pain level will decrease  3/4/2022 2153 by Brianne Ndiaye RN  Outcome: Ongoing  3/4/2022 1142 by Leslee Farah RN  Outcome: Ongoing  Goal: Control of acute pain  Description: Control of acute pain  3/4/2022 2153 by Brianne Ndiaye RN  Outcome: Ongoing  3/4/2022 1142 by Leslee Farah RN  Outcome: Ongoing  Goal: Control of chronic pain  Description: Control of chronic pain  3/4/2022 2153 by Brianne Ndiaye RN  Outcome: Ongoing  3/4/2022 1142 by Leslee Farah RN  Outcome: Ongoing     Problem: Skin Integrity:  Goal: Skin integrity will stabilize  Description: Skin integrity will stabilize  3/4/2022 2153 by Brianne Ndiaye RN  Outcome: Ongoing  3/4/2022 1142 by Leslee Farah RN  Outcome: Ongoing     Problem: Discharge Planning:  Goal: Patients continuum of care needs are met  Description: Patients continuum of care needs are met  3/4/2022 2153 by Brianne Ndiaye RN  Outcome: Ongoing  3/4/2022 1142 by Leslee Farah RN  Outcome: Ongoing     Problem: Discharge Planning:  Goal: Discharged to appropriate level of care  Description: Discharged to appropriate level of care  3/4/2022 2153 by Brianne Ndiaye RN  Outcome: Ongoing  3/4/2022 1142 by Leslee Farah RN  Outcome: Ongoing

## 2022-03-05 NOTE — OP NOTE
[facility]                     [facility], [address]                                OPERATIVE REPORT    PATIENT NAME: Radha Watters                       :        1940  MED REC NO:   [id number]                          ROOM:       5579  ACCOUNT NO:   [de-identified]                           ADMIT DATE: 2022  PROVIDER:     Janelle Ruiz MD    DATE OF PROCEDURE:  2022    PREOPERATIVE DIAGNOSIS:  Right hydronephrosis. 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 OPERATION PERFORMED:  Cystoscopy with right retrograde pyelogram and  right ureteral stent placement. SURGEON:  Janelle Ruiz MD    ANESTHESIA:  General.    BLOOD LOSS:  Minimal.    DISPOSITION:  The patient will be kept in the hospital, will be  reevaluated tomorrow. OPERATIVE PROCEDURE:  The patient was brought to the operating room. She had Ancef intravenously. She had pneumatic compression stockings on  and in place. She was given a general anesthetic, placed in lithotomy  position, prepped with Betadine and draped in the sterile fashion. The  scope with the obturator was placed atraumatically within the bladder. The bladder did show some mild erythema. There was no tumor, no foreign  body, no active bleeding, no stone. The ureteral orifice on the right  side was identified. A wire was advanced within it, upped in the mid  ureter followed by an open-end ureteral catheter. 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   We actually had a hard time injecting any contrast by the level  of obstruction, but decided to go ahead _____ wire and this actually  advanced fairly easily through this area in what appeared to be the  kidney and we followed this with the open-end ureteral catheter. The  wire was pulled. There was an obvious hydronephrotic drip of clear  yellow urine. We did inject some contrast which confirmed placement  within an upper pole dilated collecting system. The wire was then  repositioned up in the upper collecting system and the catheter was  removed and a 6-Cam[place]onian/26 cm double-J ureteral stent was advanced over  the wire with a proximal curl seen in the kidney and distal curl in the  bladder. There was a slight amount of bleeding from the stent at that  point, but relatively minimal.  The bladder was emptied. The scope was  withdrawn. The patient was sent to recovery room. She will be  extubated and kept overnight, reevaluate tomorrow. We will discuss  findings with the patient and her son and discuss the best course of  option at this point as well.         Nishi Solis MD    D: 03/05/2022 11:39:42       T: 03/05/2022 13:20:47     [id number]  Job#: [id number]     Doc#: [id number]    CC:

## 2022-03-05 NOTE — PROGRESS NOTES
Encounters:   03/03/22 200 lb (90.7 kg)   03/09/21 214 lb (97.1 kg)   11/06/20 210 lb (95.3 kg)       General appearance:  Appears comfortable. Awakens to name, light touch   Eyes: Sclera clear. Pupils equal.  ENT: Moist oral mucosa. Trachea midline, no adenopathy. Cardiovascular: Regular rhythm, normal S1, S2. No murmur. No edema in lower extremities  Respiratory: Not using accessory muscles. Decreased breath sounds in bases. No wheezing   GI: Abdomen soft, no current guarding or rebound. Bowel sounds noted. Musculoskeletal: No cyanosis in digits, neck supple  Neurology: CN 2-12 grossly intact. No speech or motor deficits  Psych: lethargic but arrouseable   Skin: Warm, dry, normal turgor    Labs and Tests:  CBC:   Recent Labs     03/03/22  1135 03/04/22  0536 03/05/22  0511   WBC 4.6 5.6 6.7   HGB 12.9 12.4 12.9    287 304     BMP:    Recent Labs     03/03/22  1135 03/04/22  0536 03/05/22  0511    136 140   K 3.5 4.6 3.8   CL 97* 101 100   CO2 26 25 25   BUN 17 14 23*   CREATININE 1.1 1.2 1.3*   GLUCOSE 122* 90 95     Hepatic:   Recent Labs     03/03/22  1135 03/04/22  0536   AST 41* 48*   ALT 48* 42*   BILITOT 0.4 0.5   ALKPHOS 144* 125     CT head:  No hemorrhage or mass     Atrophy and small-vessel ischemic change     Trace paranasal sinus disease       CT abd:    Severe right-sided hydronephrosis noted. Hyperdense material is seen in the   proximal right ureter, at the right UPJ. This hyperdense material could   represent blood clot, tumor, or proteinaceous debris. Merrilyn Fritter is seen in the   right kidney     Cholelithiasis and nonobstructing left renal calculi. Partially calcified nodule is seen posteriorly in the right hepatic lobe.    This is incompletely characterized.  Correlate with any history of known   primary.  Consider non urgent follow-up abdominal MRI or CT scan, abdomen   with and without IV contrast for further characterization     Right paramidline hernia containing fat omentum and bowel.  No resultant   bowel obstruction.  There is diverticulosis.  No diverticulitis     Problem List  Principal Problem:    Hydronephrosis, right  Active Problems:    Chronic a-fib (HCC)    HTN (hypertension)    Renal mass, right    Elevated LFTs    RA (rheumatoid arthritis) ([address]  Resolved Problems:    * No resolved hospital problems. *       Assessment & Plan:   1. Right renal mass/  Hydronephrosis:  S/P cysto with stent placement. Appreciate input from urology. 2. Creat of 1.3 noted : continue with IV hydration and follow closely. Continue with ARB for now   3. Acute on chronic pain:  Pt was taking 10 mg norco tabs q 4 hours prior to admission   4. Chronic AF:  Rate controlled on BB and flecainide. She is AC with coumadin ( INR 4.2 ,  Hold coumadin )   5. HTN: improved on home therapy   6. Low Magnesium; resolved with replacement therapy  7. PT/ OT to follow later today          Diet: ADULT DIET;  Regular  Code:Full Code  DVT PPX      LAUREN Bay CNP   3/5/2022 12:43 PM

## 2022-03-06 ENCOUNTER — APPOINTMENT (OUTPATIENT)
Dept: CT IMAGING | Age: 82
DRG: 657 | End: 2022-03-06
Payer: MEDICARE

## 2022-03-06 LAB
ANION GAP SERPL CALCULATED.3IONS-SCNC: 13 MMOL/L (ref 3–16)
BUN BLDV-MCNC: 30 MG/DL (ref 7–20)
CALCIUM SERPL-MCNC: 8.5 MG/DL (ref 8.3–10.6)
CHLORIDE BLD-SCNC: 101 MMOL/L (ref 99–110)
CO2: 22 MMOL/L (ref 21–32)
CREAT SERPL-MCNC: 1.5 MG/DL (ref 0.6–1.2)
GFR AFRICAN AMERICAN: 40
GFR NON-AFRICAN AMERICAN: 33
GLUCOSE BLD-MCNC: 137 MG/DL (ref 70–99)
GLUCOSE BLD-MCNC: 96 MG/DL (ref 70–99)
HCT VFR BLD CALC: 38.7 % (ref 36–48)
HEMOGLOBIN: 12.5 G/DL (ref 12–16)
INR BLD: 3.33 (ref 0.88–1.12)
MAGNESIUM: 2.1 MG/DL (ref 1.8–2.4)
MCH RBC QN AUTO: 30.6 PG (ref 26–34)
MCHC RBC AUTO-ENTMCNC: 32.2 G/DL (ref 31–36)
MCV RBC AUTO: 95 FL (ref 80–100)
PDW BLD-RTO: 17.3 % (ref 12.4–15.4)
PERFORMED ON: ABNORMAL
PLATELET # BLD: 289 K/UL (ref 135–450)
PMV BLD AUTO: 7.4 FL (ref 5–10.5)
POTASSIUM SERPL-SCNC: 4.3 MMOL/L (ref 3.5–5.1)
PROTHROMBIN TIME: 39.6 SEC (ref 9.9–12.7)
RBC # BLD: 4.08 M/UL (ref 4–5.2)
SODIUM BLD-SCNC: 136 MMOL/L (ref 136–145)
WBC # BLD: 9.3 K/UL (ref 4–11)

## 2022-03-06 PROCEDURE — 97530 THERAPEUTIC ACTIVITIES: CPT

## 2022-03-06 PROCEDURE — 6370000000 HC RX 637 (ALT 250 FOR IP): Performed by: NURSE PRACTITIONER

## 2022-03-06 PROCEDURE — 36415 COLL VENOUS BLD VENIPUNCTURE: CPT

## 2022-03-06 PROCEDURE — 2580000003 HC RX 258: Performed by: UROLOGY

## 2022-03-06 PROCEDURE — 97535 SELF CARE MNGMENT TRAINING: CPT

## 2022-03-06 PROCEDURE — 6370000000 HC RX 637 (ALT 250 FOR IP): Performed by: UROLOGY

## 2022-03-06 PROCEDURE — 83735 ASSAY OF MAGNESIUM: CPT

## 2022-03-06 PROCEDURE — 6360000002 HC RX W HCPCS: Performed by: UROLOGY

## 2022-03-06 PROCEDURE — 93005 ELECTROCARDIOGRAM TRACING: CPT | Performed by: NURSE PRACTITIONER

## 2022-03-06 PROCEDURE — 97165 OT EVAL LOW COMPLEX 30 MIN: CPT

## 2022-03-06 PROCEDURE — 80048 BASIC METABOLIC PNL TOTAL CA: CPT

## 2022-03-06 PROCEDURE — 74176 CT ABD & PELVIS W/O CONTRAST: CPT

## 2022-03-06 PROCEDURE — 85027 COMPLETE CBC AUTOMATED: CPT

## 2022-03-06 PROCEDURE — 1200000000 HC SEMI PRIVATE

## 2022-03-06 PROCEDURE — 85610 PROTHROMBIN TIME: CPT

## 2022-03-06 PROCEDURE — 94760 N-INVAS EAR/PLS OXIMETRY 1: CPT

## 2022-03-06 PROCEDURE — 97161 PT EVAL LOW COMPLEX 20 MIN: CPT

## 2022-03-06 PROCEDURE — 2580000003 HC RX 258: Performed by: NURSE PRACTITIONER

## 2022-03-06 RX ORDER — SODIUM CHLORIDE 9 MG/ML
INJECTION, SOLUTION INTRAVENOUS CONTINUOUS
Status: DISCONTINUED | OUTPATIENT
Start: 2022-03-06 | End: 2022-03-07 | Stop reason: HOSPADM

## 2022-03-06 RX ORDER — CHLORDIAZEPOXIDE HYDROCHLORIDE 5 MG/1
10 CAPSULE, GELATIN COATED ORAL EVERY 6 HOURS PRN
Status: DISCONTINUED | OUTPATIENT
Start: 2022-03-06 | End: 2022-03-07 | Stop reason: HOSPADM

## 2022-03-06 RX ORDER — HYDROMORPHONE HYDROCHLORIDE 2 MG/1
1 TABLET ORAL ONCE
Status: COMPLETED | OUTPATIENT
Start: 2022-03-06 | End: 2022-03-06

## 2022-03-06 RX ADMIN — HYDROCODONE BITARTRATE AND ACETAMINOPHEN 1 TABLET: 10; 325 TABLET ORAL at 02:52

## 2022-03-06 RX ADMIN — HYDROCODONE BITARTRATE AND ACETAMINOPHEN 1 TABLET: 10; 325 TABLET ORAL at 12:27

## 2022-03-06 RX ADMIN — VALSARTAN 320 MG: 160 TABLET, FILM COATED ORAL at 08:02

## 2022-03-06 RX ADMIN — CARVEDILOL 12.5 MG: 3.12 TABLET, FILM COATED ORAL at 08:02

## 2022-03-06 RX ADMIN — FOLIC ACID 1 MG: 1 TABLET ORAL at 08:02

## 2022-03-06 RX ADMIN — Medication 1 TABLET: at 08:02

## 2022-03-06 RX ADMIN — HYDROMORPHONE HYDROCHLORIDE 1 MG: 2 TABLET ORAL at 09:36

## 2022-03-06 RX ADMIN — HYDROCODONE BITARTRATE AND ACETAMINOPHEN 1 TABLET: 10; 325 TABLET ORAL at 16:25

## 2022-03-06 RX ADMIN — ONDANSETRON 4 MG: 2 INJECTION INTRAMUSCULAR; INTRAVENOUS at 20:38

## 2022-03-06 RX ADMIN — SODIUM CHLORIDE: 9 INJECTION, SOLUTION INTRAVENOUS at 09:36

## 2022-03-06 RX ADMIN — DIAZEPAM 2 MG: 2 TABLET ORAL at 20:38

## 2022-03-06 RX ADMIN — MORPHINE SULFATE 4 MG: 4 INJECTION INTRAVENOUS at 00:32

## 2022-03-06 RX ADMIN — HYDROCODONE BITARTRATE AND ACETAMINOPHEN 1 TABLET: 10; 325 TABLET ORAL at 08:02

## 2022-03-06 RX ADMIN — FLECAINIDE ACETATE 100 MG: 100 TABLET ORAL at 08:02

## 2022-03-06 RX ADMIN — CARVEDILOL 12.5 MG: 3.12 TABLET, FILM COATED ORAL at 16:25

## 2022-03-06 RX ADMIN — FLECAINIDE ACETATE 100 MG: 100 TABLET ORAL at 20:38

## 2022-03-06 RX ADMIN — Medication 10 ML: at 08:02

## 2022-03-06 RX ADMIN — METHIMAZOLE 5 MG: 5 TABLET ORAL at 08:02

## 2022-03-06 RX ADMIN — METHIMAZOLE 5 MG: 5 TABLET ORAL at 12:27

## 2022-03-06 RX ADMIN — HYDROCODONE BITARTRATE AND ACETAMINOPHEN 1 TABLET: 10; 325 TABLET ORAL at 20:38

## 2022-03-06 RX ADMIN — METHIMAZOLE 5 MG: 5 TABLET ORAL at 20:38

## 2022-03-06 ASSESSMENT — PAIN SCALES - GENERAL
PAINLEVEL_OUTOF10: 8
PAINLEVEL_OUTOF10: 10
PAINLEVEL_OUTOF10: 4
PAINLEVEL_OUTOF10: 8
PAINLEVEL_OUTOF10: 10
PAINLEVEL_OUTOF10: 8
PAINLEVEL_OUTOF10: 10
PAINLEVEL_OUTOF10: 9
PAINLEVEL_OUTOF10: 8
PAINLEVEL_OUTOF10: 10
PAINLEVEL_OUTOF10: 8
PAINLEVEL_OUTOF10: 10
PAINLEVEL_OUTOF10: 4
PAINLEVEL_OUTOF10: 9
PAINLEVEL_OUTOF10: 0
PAINLEVEL_OUTOF10: 5
PAINLEVEL_OUTOF10: 10

## 2022-03-06 ASSESSMENT — PAIN DESCRIPTION - PAIN TYPE
TYPE: ACUTE PAIN

## 2022-03-06 ASSESSMENT — PAIN DESCRIPTION - LOCATION
LOCATION: ABDOMEN
LOCATION: ABDOMEN;FLANK
LOCATION: ABDOMEN

## 2022-03-06 ASSESSMENT — PAIN DESCRIPTION - DESCRIPTORS
DESCRIPTORS: SHARP;CONSTANT
DESCRIPTORS: SHARP;CONSTANT

## 2022-03-06 ASSESSMENT — PAIN DESCRIPTION - ONSET
ONSET: ON-GOING
ONSET: ON-GOING

## 2022-03-06 ASSESSMENT — PAIN DESCRIPTION - FREQUENCY
FREQUENCY: CONTINUOUS

## 2022-03-06 ASSESSMENT — PAIN DESCRIPTION - ORIENTATION
ORIENTATION: RIGHT;LEFT
ORIENTATION: RIGHT
ORIENTATION: RIGHT;LEFT

## 2022-03-06 ASSESSMENT — PAIN DESCRIPTION - PROGRESSION
CLINICAL_PROGRESSION: NOT CHANGED
CLINICAL_PROGRESSION: NOT CHANGED

## 2022-03-06 ASSESSMENT — PAIN - FUNCTIONAL ASSESSMENT: PAIN_FUNCTIONAL_ASSESSMENT: PREVENTS OR INTERFERES SOME ACTIVE ACTIVITIES AND ADLS

## 2022-03-06 NOTE — PROGRESS NOTES
Pharmacy to Dose Warfarin    Pharmacy consulted to dose warfarin for Afib. INR Goal: 2-3    INR today: 3.33    Assessment/Plan:  - INR supratherapeutic today  - Will hold warfarin tonight and anticipate resuming tomorrow  - Daily INR ordered2    Pharmacy will continue to follow.     Olga Lidia Marcelo, PharmD, [facility]  Clinical Pharmacist  [id number]
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
## 2022-03-06 NOTE — PLAN OF CARE
Problem: Falls - Risk of:  Goal: Will remain free from falls  Description: Will remain free from falls  3/5/2022 2052 by Lakia Arora RN  Outcome: Ongoing  3/5/2022 0851 by Vivi Carr RN  Outcome: Ongoing  Goal: Absence of physical injury  Description: Absence of physical injury  3/5/2022 2052 by Lakia Arora RN  Outcome: Ongoing  3/5/2022 0851 by Vivi Carr RN  Outcome: Ongoing     Problem: Skin Integrity:  Goal: Will show no infection signs and symptoms  Description: Will show no infection signs and symptoms  3/5/2022 2052 by Lakia Arora RN  Outcome: Ongoing  3/5/2022 0851 by Vivi Carr RN  Outcome: Ongoing  Goal: Absence of new skin breakdown  Description: Absence of new skin breakdown  3/5/2022 2052 by Lakia Arora RN  Outcome: Ongoing  3/5/2022 0851 by Vivi Carr RN  Outcome: Ongoing     Problem: Infection:  Goal: Will remain free from infection  Description: Will remain free from infection  3/5/2022 2052 by Lakia Arora RN  Outcome: Ongoing  3/5/2022 0851 by Vivi Carr RN  Outcome: Ongoing     Problem: Safety:  Goal: Free from accidental physical injury  Description: Free from accidental physical injury  3/5/2022 2052 by Lakia Arora RN  Outcome: Ongoing  3/5/2022 0851 by Vivi Carr RN  Outcome: Ongoing  Goal: Free from intentional harm  Description: Free from intentional harm  3/5/2022 2052 by Lakia Arora RN  Outcome: Ongoing  3/5/2022 0851 by Vivi Carr RN  Outcome: Ongoing     Problem: Daily Care:  Goal: Daily care needs are met  Description: Daily care needs are met  3/5/2022 2052 by Lakia Arora RN  Outcome: Ongoing  3/5/2022 0851 by Vivi Carr RN  Outcome: Ongoing     Problem: Pain:  Goal: Patient's pain/discomfort is manageable  Description: Patient's pain/discomfort is manageable  3/5/2022 2052 by Lakia Arora RN  Outcome: Ongoing  3/5/2022 0851 by Vivi Carr RN  Outcome: Ongoing  Goal: Pain level will decrease  Description:

## 2022-03-06 NOTE — PLAN OF CARE
Problem: Falls - Risk of:  Goal: Will remain free from falls  Description: Will remain free from falls  3/6/2022 0843 by Heriberto Arita RN  Outcome: Ongoing  3/5/2022 2052 by José Miguel Santiago RN  Outcome: Ongoing  Goal: Absence of physical injury  Description: Absence of physical injury  3/6/2022 0843 by Heriberto Arita RN  Outcome: Ongoing  3/5/2022 2052 by José Miguel Santiago RN  Outcome: Ongoing     Problem: Skin Integrity:  Goal: Will show no infection signs and symptoms  Description: Will show no infection signs and symptoms  3/6/2022 0843 by Heriberto Arita RN  Outcome: Ongoing  3/5/2022 2052 by José Miguel Santiago RN  Outcome: Ongoing  Goal: Absence of new skin breakdown  Description: Absence of new skin breakdown  3/6/2022 0843 by Heriberto Arita RN  Outcome: Ongoing  3/5/2022 2052 by José Miguel Santiago RN  Outcome: Ongoing     Problem: Infection:  Goal: Will remain free from infection  Description: Will remain free from infection  3/6/2022 0843 by Heriberto Arita RN  Outcome: Ongoing  3/5/2022 2052 by José Miguel Santiago RN  Outcome: Ongoing     Problem: Safety:  Goal: Free from accidental physical injury  Description: Free from accidental physical injury  3/6/2022 0843 by Heriberto Arita RN  Outcome: Ongoing  3/5/2022 2052 by José Miguel Santiago RN  Outcome: Ongoing  Goal: Free from intentional harm  Description: Free from intentional harm  3/6/2022 0843 by Heriberto Arita RN  Outcome: Ongoing  3/5/2022 2052 by José Miguel Santiago RN  Outcome: Ongoing     Problem: Daily Care:  Goal: Daily care needs are met  Description: Daily care needs are met  3/6/2022 0843 by Heriberto Arita RN  Outcome: Ongoing  3/5/2022 2052 by José Miguel Santiago RN  Outcome: Ongoing     Problem: Pain:  Goal: Patient's pain/discomfort is manageable  Description: Patient's pain/discomfort is manageable  3/6/2022 0843 by Heriberto Arita RN  Outcome: Ongoing  3/5/2022 2052 by José Miguel Santiago RN  Outcome: Ongoing  Goal: Pain level will decrease  Description: Pain level will decrease  3/6/2022 0843 by Marcos Coffman RN  Outcome: Ongoing  3/5/2022 2052 by Marycruz Ballesteros RN  Outcome: Ongoing  Goal: Control of acute pain  Description: Control of acute pain  3/6/2022 0843 by Marcos Coffman RN  Outcome: Ongoing  3/5/2022 2052 by Marycruz Ballesteros RN  Outcome: Ongoing  Goal: Control of chronic pain  Description: Control of chronic pain  3/6/2022 0843 by Marcos Coffman RN  Outcome: Ongoing  3/5/2022 2052 by Marycruz Ballesteros RN  Outcome: Ongoing     Problem: Skin Integrity:  Goal: Skin integrity will stabilize  Description: Skin integrity will stabilize  3/6/2022 0843 by Marcos Coffman RN  Outcome: Ongoing  3/5/2022 2052 by Marycruz Ballesteros RN  Outcome: Ongoing     Problem: Discharge Planning:  Goal: Patients continuum of care needs are met  Description: Patients continuum of care needs are met  3/6/2022 0843 by Marcos Coffman RN  Outcome: Ongoing  3/5/2022 2052 by Marycruz Ballesteros RN  Outcome: Ongoing     Problem: Discharge Planning:  Goal: Discharged to appropriate level of care  Description: Discharged to appropriate level of care  3/6/2022 0843 by Marcos Coffman RN  Outcome: Ongoing  3/5/2022 2052 by Marycruz Ballesteros RN  Outcome: Ongoing

## 2022-03-06 NOTE — PROGRESS NOTES
Pod 1 from cysto and right stent  Pain worse overnight   Cr 1.5  Will get abd pelvic ct without contrast today and considering options tomorrow

## 2022-03-06 NOTE — PROGRESS NOTES
[facility] PROGRESS NOTE    3/6/2022 7:39 AM        Name: Lisa Payton Admitted: 3/3/2022  Primary Care Provider: Mandy Cordova (Tel: [phone])      Subjective:  . Admitted with left lower abdominal pain which has been present for weeks. Noted to have right renal mass with hydronephrosis   Has chronic pain and takes Norco 10 mg tabs q 4 hours prior to admission  ( Dr Lexi Abdalla)   Also with chronic anxiety. POD # 1 from  [address] [place] [address], RIGHT STENT INSERTION    Pt seen this am and reports pain over the right lower abdomin. Reports pain is worse since stent insertion.   ( This was discussed with Dr Marco Antonio Bryant)       CKD with creat of 1.5 in January per Oliver     Reviewed interval ancillary notes    Current Medications  HYDROcodone-acetaminophen (NORCO)  MG per tablet 1 tablet, Q4H PRN  HYDROcodone-acetaminophen (NORCO) 5-325 MG per tablet 1 tablet, Q4H PRN  morphine injection 4 mg, Q3H PRN  valsartan (DIOVAN) tablet 320 mg, Daily  diazePAM (VALIUM) tablet 2 mg, Q6H PRN  warfarin placeholder: dosing by pharmacy, RX Placeholder  sodium chloride flush 0.9 % injection 5-40 mL, 2 times per day  sodium chloride flush 0.9 % injection 5-40 mL, PRN  0.9 % sodium chloride infusion, PRN  ondansetron (ZOFRAN-ODT) disintegrating tablet 4 mg, Q8H PRN   Or  ondansetron (ZOFRAN) injection 4 mg, Q6H PRN  polyethylene glycol (GLYCOLAX) packet 17 g, Daily PRN  acetaminophen (TYLENOL) tablet 650 mg, Q6H PRN   Or  acetaminophen (TYLENOL) suppository 650 mg, Q6H PRN  flecainide (TAMBOCOR) tablet 100 mg, BID  carvedilol (COREG) tablet 09.8 mg, BID WC  folic acid (FOLVITE) tablet 1 mg, Daily  methIMAzole (TAPAZOLE) tablet 5 mg, TID  therapeutic multivitamin-minerals 1 tablet, Daily  hydrALAZINE (APRESOLINE) injection 10 mg, Q4H PRN        Objective:  BP (!) 164/72   Pulse 79   Temp 98.3 °F (36.8 °C) (Oral)   Resp 20   Ht 5' 6\" (1.676 m)   Wt 200 lb (90.7 kg)   SpO2 91%   BMI 32.28 kg/m²     Intake/Output Summary (Last 24 hours) at 3/6/2022 0739  Last data filed at 3/5/2022 2038  Gross per 24 hour   Intake 240 ml   Output 1400 ml   Net -1160 ml      Wt Readings from Last 3 Encounters:   03/03/22 200 lb (90.7 kg)   03/09/21 214 lb (97.1 kg)   11/06/20 210 lb (95.3 kg)       General appearance:  Appears anxious when awakened. She is alert and oriented   Eyes: Sclera clear. Pupils equal.  ENT: Moist oral mucosa. Trachea midline, no adenopathy. Cardiovascular: Regular rhythm, normal S1, S2. No murmur. No edema in lower extremities  Respiratory: Not using accessory muscles. Decreased breath sounds in bases. No wheezing   GI: Abdomen soft, no current guarding or rebound. Bowel sounds noted. Musculoskeletal: No cyanosis in digits, neck supple  Neurology: CN 2-12 grossly intact. No speech or motor deficits  Psych: lethargic but arrouseable   Skin: Warm, dry, normal turgor    Labs and Tests:  CBC:   Recent Labs     03/04/22  0536 03/05/22  0511 03/06/22  0048   WBC 5.6 6.7 9.3   HGB 12.4 12.9 12.5    304 289     BMP:    Recent Labs     03/04/22  0536 03/05/22  0511 03/06/22  0048    140 136   K 4.6 3.8 4.3    100 101   CO2 25 25 22   BUN 14 23* 30*   CREATININE 1.2 1.3* 1.5*   GLUCOSE 90 95 96     Hepatic:   Recent Labs     03/03/22  1135 03/04/22  0536   AST 41* 48*   ALT 48* 42*   BILITOT 0.4 0.5   ALKPHOS 144* 125     CT head:  No hemorrhage or mass     Atrophy and small-vessel ischemic change     Trace paranasal sinus disease       CT abd:    Severe right-sided hydronephrosis noted. Hyperdense material is seen in the   proximal right ureter, at the right UPJ. This hyperdense material could   represent blood clot, tumor, or proteinaceous debris. [place] is seen in the   right kidney     Cholelithiasis and nonobstructing left renal calculi.      Partially calcified nodule is seen posteriorly in the right hepatic lobe. This is incompletely characterized.  Correlate with any history of known   primary.  Consider non urgent follow-up abdominal MRI or CT scan, abdomen   with and without IV contrast for further characterization     Right paramidline hernia containing fat omentum and bowel.  No resultant   bowel obstruction.  There is diverticulosis.  No diverticulitis         INR 3.3       Problem List  Principal Problem:    Hydronephrosis, right  Active Problems:    Chronic a-fib (HCC)    HTN (hypertension)    Renal mass, right    Elevated LFTs    RA (rheumatoid arthritis) ([address]  Resolved Problems:    * No resolved hospital problems. *       Assessment & Plan:   1. Right renal mass/  Hydronephrosis:  S/P cysto with stent placement. Pt reports increased pain. 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 2. Creat of 1.5 noted : continue with IV hydration since she is not drinking much. ( Creat 1.5 in January)   Continue with ARB for now   3. Acute on chronic pain:  Pt was taking 10 mg norco tabs q 4 hours prior to admission , give one time dose of IV dilaudid this am   4. Chronic anxiety:  Resume home dose of librium   5. Chronic AF:  Rate controlled on BB and flecainide. She is AC with coumadin,  INR 3.3  6. HTN: improved on home therapy   7. Low Magnesium; resolved with replacement therapy  8. PT/ OT to follow        Diet: ADULT DIET;  Regular  Code:Full Code  DVT PPX      LAUREN Beltre CNP   3/6/2022 7:39 AM

## 2022-03-07 VITALS
TEMPERATURE: 98.6 F | DIASTOLIC BLOOD PRESSURE: 77 MMHG | WEIGHT: 200 LBS | OXYGEN SATURATION: 91 % | RESPIRATION RATE: 16 BRPM | HEIGHT: 66 IN | HEART RATE: 76 BPM | BODY MASS INDEX: 32.14 KG/M2 | SYSTOLIC BLOOD PRESSURE: 148 MMHG

## 2022-03-07 LAB
ANION GAP SERPL CALCULATED.3IONS-SCNC: 10 MMOL/L (ref 3–16)
BUN BLDV-MCNC: 23 MG/DL (ref 7–20)
CALCIUM SERPL-MCNC: 8.8 MG/DL (ref 8.3–10.6)
CHLORIDE BLD-SCNC: 104 MMOL/L (ref 99–110)
CO2: 25 MMOL/L (ref 21–32)
CREAT SERPL-MCNC: 0.9 MG/DL (ref 0.6–1.2)
EKG ATRIAL RATE: 69 BPM
EKG ATRIAL RATE: 86 BPM
EKG DIAGNOSIS: NORMAL
EKG DIAGNOSIS: NORMAL
EKG P AXIS: 71 DEGREES
EKG P AXIS: 85 DEGREES
EKG P-R INTERVAL: 248 MS
EKG P-R INTERVAL: 252 MS
EKG Q-T INTERVAL: 406 MS
EKG Q-T INTERVAL: 414 MS
EKG QRS DURATION: 102 MS
EKG QRS DURATION: 96 MS
EKG QTC CALCULATION (BAZETT): 443 MS
EKG QTC CALCULATION (BAZETT): 485 MS
EKG R AXIS: 57 DEGREES
EKG R AXIS: 64 DEGREES
EKG T AXIS: 57 DEGREES
EKG T AXIS: 59 DEGREES
EKG VENTRICULAR RATE: 69 BPM
EKG VENTRICULAR RATE: 86 BPM
GFR AFRICAN AMERICAN: >60
GFR NON-AFRICAN AMERICAN: 60
GLUCOSE BLD-MCNC: 117 MG/DL (ref 70–99)
HCT VFR BLD CALC: 33.7 % (ref 36–48)
HEMOGLOBIN: 11.3 G/DL (ref 12–16)
INR BLD: 1.89 (ref 0.88–1.12)
MAGNESIUM: 2.2 MG/DL (ref 1.8–2.4)
MCH RBC QN AUTO: 31.8 PG (ref 26–34)
MCHC RBC AUTO-ENTMCNC: 33.6 G/DL (ref 31–36)
MCV RBC AUTO: 94.7 FL (ref 80–100)
PDW BLD-RTO: 16.6 % (ref 12.4–15.4)
PLATELET # BLD: 222 K/UL (ref 135–450)
PMV BLD AUTO: 7.9 FL (ref 5–10.5)
POTASSIUM SERPL-SCNC: 4 MMOL/L (ref 3.5–5.1)
PROTHROMBIN TIME: 21.9 SEC (ref 9.9–12.7)
RBC # BLD: 3.55 M/UL (ref 4–5.2)
SODIUM BLD-SCNC: 139 MMOL/L (ref 136–145)
WBC # BLD: 9.4 K/UL (ref 4–11)

## 2022-03-07 PROCEDURE — 93010 ELECTROCARDIOGRAM REPORT: CPT | Performed by: INTERNAL MEDICINE

## 2022-03-07 PROCEDURE — 6370000000 HC RX 637 (ALT 250 FOR IP): Performed by: UROLOGY

## 2022-03-07 PROCEDURE — 6360000002 HC RX W HCPCS: Performed by: UROLOGY

## 2022-03-07 PROCEDURE — 6370000000 HC RX 637 (ALT 250 FOR IP): Performed by: NURSE PRACTITIONER

## 2022-03-07 PROCEDURE — 93005 ELECTROCARDIOGRAM TRACING: CPT | Performed by: NURSE PRACTITIONER

## 2022-03-07 PROCEDURE — 80048 BASIC METABOLIC PNL TOTAL CA: CPT

## 2022-03-07 PROCEDURE — 85610 PROTHROMBIN TIME: CPT

## 2022-03-07 PROCEDURE — 36415 COLL VENOUS BLD VENIPUNCTURE: CPT

## 2022-03-07 PROCEDURE — 2580000003 HC RX 258: Performed by: NURSE PRACTITIONER

## 2022-03-07 PROCEDURE — 97116 GAIT TRAINING THERAPY: CPT

## 2022-03-07 PROCEDURE — 97530 THERAPEUTIC ACTIVITIES: CPT

## 2022-03-07 PROCEDURE — 94760 N-INVAS EAR/PLS OXIMETRY 1: CPT

## 2022-03-07 PROCEDURE — 85027 COMPLETE CBC AUTOMATED: CPT

## 2022-03-07 PROCEDURE — 83735 ASSAY OF MAGNESIUM: CPT

## 2022-03-07 RX ORDER — WARFARIN SODIUM 2 MG/1
2 TABLET ORAL DAILY
Status: DISCONTINUED | OUTPATIENT
Start: 2022-03-07 | End: 2022-03-07 | Stop reason: HOSPADM

## 2022-03-07 RX ORDER — PAROXETINE HYDROCHLORIDE 20 MG/1
40 TABLET, FILM COATED ORAL DAILY
Status: DISCONTINUED | OUTPATIENT
Start: 2022-03-07 | End: 2022-03-07 | Stop reason: HOSPADM

## 2022-03-07 RX ADMIN — CARVEDILOL 12.5 MG: 3.12 TABLET, FILM COATED ORAL at 09:55

## 2022-03-07 RX ADMIN — MORPHINE SULFATE 4 MG: 4 INJECTION INTRAVENOUS at 12:32

## 2022-03-07 RX ADMIN — DIAZEPAM 2 MG: 2 TABLET ORAL at 10:49

## 2022-03-07 RX ADMIN — BISACODYL 5 MG: 5 TABLET, COATED ORAL at 10:49

## 2022-03-07 RX ADMIN — POLYETHYLENE GLYCOL 3350 17 G: 17 POWDER, FOR SOLUTION ORAL at 10:49

## 2022-03-07 RX ADMIN — ONDANSETRON 4 MG: 2 INJECTION INTRAMUSCULAR; INTRAVENOUS at 03:40

## 2022-03-07 RX ADMIN — Medication 1 TABLET: at 09:53

## 2022-03-07 RX ADMIN — METHIMAZOLE 5 MG: 5 TABLET ORAL at 09:53

## 2022-03-07 RX ADMIN — PAROXETINE HYDROCHLORIDE 40 MG: 20 TABLET, FILM COATED ORAL at 14:14

## 2022-03-07 RX ADMIN — SODIUM CHLORIDE: 9 INJECTION, SOLUTION INTRAVENOUS at 00:34

## 2022-03-07 RX ADMIN — METHIMAZOLE 5 MG: 5 TABLET ORAL at 14:14

## 2022-03-07 RX ADMIN — HYDROCODONE BITARTRATE AND ACETAMINOPHEN 1 TABLET: 10; 325 TABLET ORAL at 09:53

## 2022-03-07 RX ADMIN — HYDROCODONE BITARTRATE AND ACETAMINOPHEN 1 TABLET: 10; 325 TABLET ORAL at 14:14

## 2022-03-07 RX ADMIN — VALSARTAN 320 MG: 160 TABLET, FILM COATED ORAL at 09:53

## 2022-03-07 RX ADMIN — Medication 1 PACKET: at 10:49

## 2022-03-07 RX ADMIN — FLECAINIDE ACETATE 100 MG: 100 TABLET ORAL at 09:53

## 2022-03-07 RX ADMIN — HYDROCODONE BITARTRATE AND ACETAMINOPHEN 1 TABLET: 10; 325 TABLET ORAL at 00:35

## 2022-03-07 RX ADMIN — FOLIC ACID 1 MG: 1 TABLET ORAL at 09:53

## 2022-03-07 ASSESSMENT — PAIN SCALES - GENERAL
PAINLEVEL_OUTOF10: 8
PAINLEVEL_OUTOF10: 7
PAINLEVEL_OUTOF10: 8
PAINLEVEL_OUTOF10: 7
PAINLEVEL_OUTOF10: 8
PAINLEVEL_OUTOF10: 5

## 2022-03-07 ASSESSMENT — PAIN DESCRIPTION - ORIENTATION: ORIENTATION: RIGHT

## 2022-03-07 ASSESSMENT — PAIN DESCRIPTION - LOCATION: LOCATION: ABDOMEN;FLANK

## 2022-03-07 ASSESSMENT — PAIN DESCRIPTION - ONSET: ONSET: ON-GOING

## 2022-03-07 ASSESSMENT — PAIN DESCRIPTION - PROGRESSION: CLINICAL_PROGRESSION: NOT CHANGED

## 2022-03-07 ASSESSMENT — PAIN DESCRIPTION - FREQUENCY: FREQUENCY: CONTINUOUS

## 2022-03-07 ASSESSMENT — PAIN DESCRIPTION - DESCRIPTORS: DESCRIPTORS: SORE;ACHING

## 2022-03-07 ASSESSMENT — PAIN DESCRIPTION - PAIN TYPE: TYPE: ACUTE PAIN

## 2022-03-07 NOTE — PROGRESS NOTES
Urology Progress Note  [facility]    Provider: LAUREN Gonzalez CNP  Patient ID:  Admission Date: 3/3/2022 Name: Howie Mccarthy  OR Date: 3/3/2022 MRN: [id number]   Patient Location: [id number] : 1940  Attending: Mateo Noel MD Date of Service: 3/7/2022  PCP: Joni Chambers     Diagnoses:  1. Tremor    2. Right sided abdominal pain    3. Chest pain, unspecified type    4. Nausea       Right renal mass- likely TCC  S/p right stent insertion 2022  Cr improved to 0.9 today  Patient is feeling overall better from a pain standpoint  Repeat CT shows persistent hydro and bleeding  Some hematuria today likely 2/2 to ureteral stent    Assessment/Plan:  Feeling better today. Initially no better with right stent insertion but reports feeling comfortable today. Cr is improved. Difficult situation with inability to obtain bx as well as age and co-morbidities. 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 Likely would be chronic right stent moving forward. Patient unsure she wants to have stent changes regularly moving forward. Palliative care consult. The patient had a chance to ask questions which were answered. she understands the above plan. Subjective:   Howie Mccarthy is a 78 y.o. female. She was seen and examined this morning. Today we discussed follow up in the office for management of ureteral stent. Objective:   Vitals:  Vitals:    22 0949   BP: (!) 161/89   Pulse: 85   Resp: 16   Temp: 98.1 °F (36.7 °C)   SpO2: 94%       Intake/Output Summary (Last 24 hours) at 3/7/2022 1105  Last data filed at 3/6/2022 2043  Gross per 24 hour   Intake 120 ml   Output 300 ml   Net -180 ml     Physical Exam:  Gen: Alert and oriented x3, no acute distress  CV: Regular rate   Resp: unlabored respirations  Abd: Soft, non-distended, non-tender, no masses  Ext: no peripheral edema noted, moves upper and lower extremities spontaneously  Skin: warmand well perfused, no rashes noted on the face, or arms. Labs:  Lab Results   Component Value Date    WBC 9.4 03/07/2022    HGB 11.3 (L) 03/07/2022    HCT 33.7 (L) 03/07/2022    MCV 94.7 03/07/2022     03/07/2022     Lab Results   Component Value Date    CREATININE 0.9 03/07/2022    BUN 23 (H) 03/07/2022     03/07/2022    K 4.0 03/07/2022     03/07/2022    CO2 25 03/07/2022       Alcides Loomis, APRN - CNP   3/7/2022

## 2022-03-07 NOTE — PROGRESS NOTES
Pharmacy to Dose Warfarin    Pharmacy consulted to dose warfarin for afib. INR Goal: 2-3  -  INR today: 1.89    Assessment/Plan:  - Resume warfarin tonight at 2mg daily. Pharmacy will continue to follow.     Stephanie Garcia, PharmD, [facility]

## 2022-03-07 NOTE — CARE COORDINATION
Discharge Planning Note:      Met with patient:    - Informed patient that PT/OT is recommending [facility] agencies were reviewed, referral to [facility] was placed at the request of the patient. - Waiting on response. Will continue to follow.     MATTHEW Silva RN      Phone: [phone]

## 2022-03-07 NOTE — PROGRESS NOTES
PM assessment completed. Pt resting well in bed with c/o right abdominal/ flank pain 8/10. PRN Norco administered. Pt reports nausea from pain. PRN zofran administered. Pt states she is not sure if she will be able to keep stent in place, would like to possibly have it removed. Encouraged pt to rest tonight, and she can speak with urology in the morning. Pt agreeable. No further needs voiced. Fall precautions in place, hourly rounding, call light and belongings in reach, bed in lowest position, wheels locked in place, side rails up x 2, walkways free of clutter. Bed alarm on.

## 2022-03-07 NOTE — DISCHARGE SUMMARY
[facility]    Patient Demographics    Patient. Silke Wilson  Date of Birth. 1940  MRN. [id number]     Primary care provider. LANIE Patel Primrose  (Tel: [phone])    Admit date: 3/3/2022    Discharge date 3/7/2022  Note Date: 3/7/2022     Reason for Hospitalization. Chief Complaint   Patient presents with    Tremors     Pt. in [facility] with report of her being ill and having tremors since yesterday that woke her up today, pt. given 4 mg Im zofran for emesis en route. Pt. concerned for UTI and abdomenal surgical hx.  Chest Pain         Significant Findings. Principal Problem:    Hydronephrosis, right  Active Problems:    Chronic a-fib (HCC)    HTN (hypertension)    Renal mass, right    Elevated LFTs    RA (rheumatoid arthritis) ([address]  Resolved Problems:    * No resolved hospital problems. *       Problems and results from this hospitalization that need follow up. 1. Probable renal Ca: follow up with urology   2. Chronic pain and anxiety: follow up with PCP and pain management     Significant test results and incidental findings. CT head:  No hemorrhage or mass     Atrophy and small-vessel ischemic change     Trace paranasal sinus disease         CT abd: prior to stent placement      Severe right-sided hydronephrosis noted. Hyperdense material is seen in the   proximal right ureter, at the right UPJ. This hyperdense material could   represent blood clot, tumor, or proteinaceous debris. [place] is seen in the   right kidney     Cholelithiasis and nonobstructing left renal calculi. Partially calcified nodule is seen posteriorly in the right hepatic lobe.    This is incompletely characterized.  Correlate with any history of known   primary.  Consider non urgent follow-up abdominal MRI or CT scan, abdomen   with and without IV contrast for further characterization Right paramidline hernia containing fat omentum and bowel.  No resultant   bowel obstruction.  There is diverticulosis.  No diverticulitis         Invasive procedures and treatments. 3/4/22:  CYSTOSCOPY RETROGRADE PYELOGRAM, RIGHT [address]. Pt was admitted with left lower abdominal pain which has been present for weeks. She is Noted to have right renal mass with hydronephrosis   Has chronic pain and anxiety issues  and takes Norco 10 mg tabs q 4 hours prior to admission  ( Dr Jose Centeno)     She was admitted and followed by urology. She was treated for the followin. Right renal mass/  Hydronephrosis:  S/P cysto with stent placement. Pt reports increased pain following stent placement and is considering having the stent removed. She will discuss this with urology at outpatient follow up visit. Patient is aware that she likely has a cancerous lesion in the proximal ureter. .      2. Creat of 1.5 noted during her stay which is close to previous values in 2022. She was eating well and voiding qs during her stay. Creat was 0.9 on the day of d/c.    3. Acute on chronic pain:  Pt was taking 10 mg norco tabs q 4 hours prior to admission ,  She initially required IV narcotics with limited results. Pain was improved at the time of d/c   4. Chronic anxiety:  Resume home dose of librium   5. Chronic AF:  Rate controlled on BB and flecainide. She is AC with coumadin,  INR was elevated upon admission and coumadin was held during her stay. INR on day of d/c was 1.8 and she will resume her home coumadin. 6. HTN: improved on home therapy       The patient was d/c home in stable condition with home care services         Consults. IP CONSULT TO UROLOGY  IP CONSULT TO SOCIAL WORK  IP CONSULT TO PHARMACY  IP CONSULT TO PHARMACY  IP CONSULT TO PALLIATIVE CARE  IP CONSULT TO HOME CARE NEEDS    Physical examination on discharge day.    BP (!) 161/89   Pulse 85 Temp 98.1 °F (36.7 °C) (Oral)   Resp 16   Ht 5' 6\" (1.676 m)   Wt 200 lb (90.7 kg)   SpO2 94%   BMI 32.28 kg/m²   General appearance. Alert. Looks comfortable. Alert and pleasant but anxious at intervals  HEENT. Sclera clear. Moist mucus membranes. Cardiovascular. Regular rate and rhythm, normal S1, S2. No murmur. Respiratory. Not using accessory muscles. Clear to auscultation bilaterally, no wheeze. Gastrointestinal. Abdomen soft, non-tender, not distended, normal bowel sounds  Neurology. Facial symmetry. No speech deficits. Moving all extremities equally. Extremities. No edema in lower extremities. Skin. Warm, dry, normal turgor    Condition at time of discharge stable     Medication instructions provided to patient at discharge. Medication List      CONTINUE taking these medications    atenolol 50 MG tablet  Commonly known as: TENORMIN     calcium carbonate 500 MG Tabs tablet  Commonly known as: OSCAL     carvedilol 12.5 MG tablet  Commonly known as: COREG     diazePAM 2 MG tablet  Commonly known as: VALIUM     ezetimibe 10 MG tablet  Commonly known as: ZETIA     flecainide 100 MG tablet  Commonly known as: TAMBOCOR     folic acid 1 MG tablet  Commonly known as: FOLVITE     furosemide 20 MG tablet  Commonly known as: LASIX     HYDROcodone-acetaminophen  MG per tablet  Commonly known as: NORCO     hyoscyamine 125 MCG tablet  Commonly known as: ANASPAZ;LEVSIN     LIBRIUM PO     methIMAzole 5 MG tablet  Commonly known as: TAPAZOLE     methotrexate 2.5 MG chemo tablet  Commonly known as: RHEUMATREX     PARoxetine 40 MG tablet  Commonly known as: PAXIL     potassium chloride 20 MEQ packet  Commonly known as: KLOR-CON     REMICADE IV     therapeutic multivitamin-minerals tablet     valsartan 320 MG tablet  Commonly known as: DIOVAN     Vitamin D3 125 MCG (5000 UT) Tabs     warfarin 3 MG tablet  Commonly known as: Coumadin  Take 1 tablet by mouth daily.             Discharge recommendations given to patient. Follow Up. in 1 week   Disposition. Home   Activity. As tolerated   Diet: ADULT DIET; Regular      Spent > 30  minutes in discharge process.     Signed:  LAUREN Sparks CNP     3/7/2022 12:20 PM
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
## 2022-03-07 NOTE — DISCHARGE INSTR - COC
Continuity of Care Form    Patient Name: Perri Chan   :  1940  MRN:  [id number]    Admit date:  3/3/2022  Discharge date:  ***    Code Status Order: Full Code   Advance Directives:      Admitting Physician:  Meghan Ward MD  PCP: Elbert Hester    Discharging Nurse: [facility] Unit/Room#: 6PJ-9125/4193-94  Discharging Unit Phone Number: [phone]    Emergency Contact:   Extended Emergency Contact Information  Primary Emergency Contact: Marek Agarwal  Address: [address] [place] [address] [address] [facility] Phone: [phone]  Work Phone: [phone]  Mobile Phone: [phone]  Relation: Child  Secondary Emergency Contact: Rafal Agarwal  Address: [address] [facility], [address] [place] [address] [address] [facility] Phone: [phone]  Relation: Child    Past Surgical History:  Past Surgical History:   Procedure Laterality Date    BLADDER SURGERY Right 3/5/2022    CYSTOSCOPY RETROGRADE PYELOGRAM, RIGHT STENT INSERTION performed by Norman Kan MD at [facility] Bilateral     knees    LEG SURGERY      REVISION COLOSTOMY         Immunization History:   Immunization History   Administered Date(s) Administered    Pneumococcal Polysaccharide ([id number]) 2013       Active Problems:  Patient Active Problem List   Diagnosis Code    Femur fracture, right ([facility] [address] S72.91XA    Chronic a-fib ([facility] [address] I48.20    HTN (hypertension) I10    Hydronephrosis, right N13.30    Renal mass, right N28.89    Elevated LFTs R79.89    RA (rheumatoid arthritis) ([facility] [address] M06.9       Isolation/Infection:   Isolation            No Isolation          Patient Infection Status       Infection Onset Added Last Indicated Last Indicated By Review Planned Expiration Resolved Resolved By    None active    Resolved    COVID-19 (Rule Out) 22 COVID-19, Rapid (Ordered)   22 Rule-Out Test Resulted            Nurse Assessment:  Last Vital Signs: BP (!) 161/89   Pulse 85   Temp 98.1 °F (36.7 °C) (Oral)   Resp 16   Ht 5' 6\" (1.676 m)   Wt 200 lb (90.7 kg)   SpO2 94%   BMI 32.28 kg/m²     Last documented pain score (0-10 scale): Pain Level: 8  Last Weight:   Wt Readings from Last 1 Encounters:   03/03/22 200 lb (90.7 kg)     Mental Status:  oriented, alert, coherent, logical, thought processes intact, and able to concentrate and follow conversation    IV Access:  - None    Nursing Mobility/ADLs:  Walking   Independent  Transfer  Independent  [place]  Assisted  Med Delivery   whole    Wound Care Documentation and Therapy:  Incision 01/12/13 Leg Right (Active)   Number of days: 3340        Elimination:  Continence: Bowel:yes  Bladder: No  Urinary Catheter: None   Colostomy/Ileostomy/Ileal Conduit: No       Date of Last BM: 3-7-2022    Intake/Output Summary (Last 24 hours) at 3/7/2022 1219  Last data filed at 3/6/2022 2043  Gross per 24 hour   Intake 120 ml   Output 300 ml   Net -180 ml     I/O last 3 completed shifts: In: 360 [P.O.:360]  Out: 1300 [Urine:1300]    Safety Concerns: At Risk for Falls    Impairments/Disabilities:      None    Nutrition Therapy:  Current Nutrition Therapy:   - Oral Diet:  General    Routes of Feeding: Oral  Liquids: Thin Liquids  Daily Fluid Restriction: no  Last Modified Barium Swallow with Video (Video Swallowing Test): not done    Treatments at the Time of Hospital Discharge:   Respiratory Treatments:   Oxygen Therapy:  is not on home oxygen therapy.   Ventilator:    - No ventilator support    Rehab Therapies: Nursing,Physical Therapy and Occupational Therapy  Weight Bearing Status/Restrictions: No weight bearing restirctions  Other Medical Equipment (for information only, NOT a DME order):  walker  Other Treatments: HOME HEALTH CARE: LEVEL 3 SAFETY       -Initial home health evaluation to occur within 24-48 hours, in patient home   -Home health agency to establish plan of care for patient over 60 day period   -Medication Reconciliation   -PT/OT/Speech evaluations in home within 24-48 hours of discharge; including  -DME and home safety   -Frontload therapy 5 days, then 3x a week   -OT to evaluate if patient has [address] needs for personal care   - evaluation within 24-48 hours, includes evaluation of resources   and insurance to determine [place], [place], LTC, and Medicaid options   -PCP Visit scheduled within three to seven days of discharge   -Telehealth-Homecare Vitals(If patient is agreeable and meets guidelines)       Patient's personal belongings (please select all that are sent with patient):  None    RN SIGNATURE:  Electronically signed by Hector Lin RN on 3/7/22 at 12:56 PM EST    CASE MANAGEMENT/SOCIAL WORK SECTION    Inpatient Status Date: ***    Readmission Risk Assessment Score:  Readmission Risk              Risk of Unplanned Readmission:  18           Discharging to Facility/ Agency   Dialysis Facility (if applicable)   Name:  Address:  Dialysis Schedule:  Phone:  Fax:    / signature: {Esignature:[id number]}    PHYSICIAN SECTION    Prognosis: Good    Condition at Discharge: Stable    Rehab Potential (if transferring to Rehab):     Recommended Labs or Other Treatments After Discharge: skilled RN care, home PT and OT     Physician Certification: I certify the above information and transfer of Nely Gardiner  is necessary for the continuing treatment of the diagnosis listed and that she requires Home Care for > 30  30 days.      Update Admission H&P: No change in H&P    PHYSICIAN SIGNATURE:  Electronically signed by LAUREN Matias CNP on 3/7/22 at 12:19 PM EST

## 2022-03-07 NOTE — CARE COORDINATION
Discharge Planning Note:    [facility], [place], [phone], has ACCEPTED the patient upon discharge. Will continue to follow.     IQRA DonaldsonN RN      Phone: [phone]

## 2022-03-07 NOTE — PROGRESS NOTES
10:24 AM  Morning assessment complete. Patient medicated for pain. Patient sitting up in the chair with chair alarm on and call light in reach. Patient having some constipation. Spoke with medical and received new orders. The care plan and education has been reviewed and mutually agreed upon with the patient. 5:11 PM  Reviewed discharge instructions with patient. Patient verbalized understanding and denies any questions. IV removed without any complications. Patient discharged to home with all belongings.

## 2022-03-08 NOTE — PROGRESS NOTES
[address] [facility] home care referral. Spoke with pt and re: home care plan of care/services. Agreeable. Demographic's verified. Aware of discharge with home care. Home care orders sent to [facility]. Discharge planner notified.

## 2022-07-06 ENCOUNTER — HOSPITAL ENCOUNTER (EMERGENCY)
Age: 82
Discharge: HOME | End: 2022-07-06
Attending: EMERGENCY MEDICINE
Payer: MEDICARE

## 2022-07-06 VITALS
SYSTOLIC BLOOD PRESSURE: 134 MMHG | OXYGEN SATURATION: 96 % | DIASTOLIC BLOOD PRESSURE: 79 MMHG | RESPIRATION RATE: 16 BRPM | TEMPERATURE: 97.6 F | HEART RATE: 68 BPM

## 2022-07-06 DIAGNOSIS — R25.1 TREMULOUSNESS: Primary | ICD-10-CM

## 2022-07-06 LAB
A/G RATIO: 0.9 (ref 1.1–2.2)
ALBUMIN SERPL-MCNC: 3.2 G/DL (ref 3.4–5)
ALP BLD-CCNC: 143 U/L (ref 40–129)
ALT SERPL-CCNC: 24 U/L (ref 10–40)
ANION GAP SERPL CALCULATED.3IONS-SCNC: 13 MMOL/L (ref 3–16)
AST SERPL-CCNC: 22 U/L (ref 15–37)
BACTERIA: ABNORMAL /HPF
BASOPHILS ABSOLUTE: 0 K/UL (ref 0–0.2)
BASOPHILS RELATIVE PERCENT: 0.6 %
BILIRUB SERPL-MCNC: 0.6 MG/DL (ref 0–1)
BILIRUBIN URINE: NEGATIVE
BLOOD, URINE: ABNORMAL
BUN BLDV-MCNC: 13 MG/DL (ref 7–20)
CALCIUM SERPL-MCNC: 9 MG/DL (ref 8.3–10.6)
CHLORIDE BLD-SCNC: 104 MMOL/L (ref 99–110)
CLARITY: CLEAR
CO2: 21 MMOL/L (ref 21–32)
COLOR: YELLOW
CREAT SERPL-MCNC: 1 MG/DL (ref 0.6–1.2)
EKG ATRIAL RATE: 111 BPM
EKG DIAGNOSIS: NORMAL
EKG Q-T INTERVAL: 440 MS
EKG QRS DURATION: 90 MS
EKG QTC CALCULATION (BAZETT): 598 MS
EKG R AXIS: 64 DEGREES
EKG T AXIS: 52 DEGREES
EKG VENTRICULAR RATE: 111 BPM
EOSINOPHILS ABSOLUTE: 0.2 K/UL (ref 0–0.6)
EOSINOPHILS RELATIVE PERCENT: 2.4 %
EPITHELIAL CELLS, UA: 0 /HPF (ref 0–5)
GFR AFRICAN AMERICAN: >60
GFR NON-AFRICAN AMERICAN: 53
GLUCOSE BLD-MCNC: 98 MG/DL (ref 70–99)
GLUCOSE URINE: NEGATIVE MG/DL
HCT VFR BLD CALC: 33.8 % (ref 36–48)
HEMOGLOBIN: 11.1 G/DL (ref 12–16)
HYALINE CASTS: 2 /LPF (ref 0–8)
KETONES, URINE: NEGATIVE MG/DL
LEUKOCYTE ESTERASE, URINE: NEGATIVE
LYMPHOCYTES ABSOLUTE: 1.1 K/UL (ref 1–5.1)
LYMPHOCYTES RELATIVE PERCENT: 14.9 %
MAGNESIUM: 1.6 MG/DL (ref 1.8–2.4)
MCH RBC QN AUTO: 29.7 PG (ref 26–34)
MCHC RBC AUTO-ENTMCNC: 33 G/DL (ref 31–36)
MCV RBC AUTO: 90 FL (ref 80–100)
MICROSCOPIC EXAMINATION: YES
MONOCYTES ABSOLUTE: 0.8 K/UL (ref 0–1.3)
MONOCYTES RELATIVE PERCENT: 9.9 %
NEUTROPHILS ABSOLUTE: 5.5 K/UL (ref 1.7–7.7)
NEUTROPHILS RELATIVE PERCENT: 72.2 %
NITRITE, URINE: NEGATIVE
PDW BLD-RTO: 17.1 % (ref 12.4–15.4)
PH UA: 6.5 (ref 5–8)
PLATELET # BLD: 319 K/UL (ref 135–450)
PMV BLD AUTO: 7.4 FL (ref 5–10.5)
POTASSIUM SERPL-SCNC: 4 MMOL/L (ref 3.5–5.1)
PROTEIN UA: NEGATIVE MG/DL
RBC # BLD: 3.75 M/UL (ref 4–5.2)
RBC UA: 21 /HPF (ref 0–4)
SODIUM BLD-SCNC: 138 MMOL/L (ref 136–145)
SPECIFIC GRAVITY UA: 1.02 (ref 1–1.03)
TOTAL PROTEIN: 6.6 G/DL (ref 6.4–8.2)
TROPONIN: <0.01 NG/ML
URINE REFLEX TO CULTURE: ABNORMAL
URINE TYPE: ABNORMAL
UROBILINOGEN, URINE: 0.2 E.U./DL
WBC # BLD: 7.7 K/UL (ref 4–11)
WBC UA: 7 /HPF (ref 0–5)

## 2022-07-06 PROCEDURE — 36415 COLL VENOUS BLD VENIPUNCTURE: CPT

## 2022-07-06 PROCEDURE — 96365 THER/PROPH/DIAG IV INF INIT: CPT

## 2022-07-06 PROCEDURE — 6370000000 HC RX 637 (ALT 250 FOR IP): Performed by: EMERGENCY MEDICINE

## 2022-07-06 PROCEDURE — 6360000002 HC RX W HCPCS: Performed by: EMERGENCY MEDICINE

## 2022-07-06 PROCEDURE — 93005 ELECTROCARDIOGRAM TRACING: CPT | Performed by: EMERGENCY MEDICINE

## 2022-07-06 PROCEDURE — 93010 ELECTROCARDIOGRAM REPORT: CPT | Performed by: INTERNAL MEDICINE

## 2022-07-06 PROCEDURE — 81001 URINALYSIS AUTO W/SCOPE: CPT

## 2022-07-06 PROCEDURE — 83735 ASSAY OF MAGNESIUM: CPT

## 2022-07-06 PROCEDURE — 85025 COMPLETE CBC W/AUTO DIFF WBC: CPT

## 2022-07-06 PROCEDURE — 80053 COMPREHEN METABOLIC PANEL: CPT

## 2022-07-06 PROCEDURE — 84484 ASSAY OF TROPONIN QUANT: CPT

## 2022-07-06 PROCEDURE — 99284 EMERGENCY DEPT VISIT MOD MDM: CPT

## 2022-07-06 RX ORDER — MAGNESIUM SULFATE 1 G/100ML
1000 INJECTION INTRAVENOUS ONCE
Status: COMPLETED | OUTPATIENT
Start: 2022-07-06 | End: 2022-07-06

## 2022-07-06 RX ORDER — AMLODIPINE BESYLATE 10 MG/1
10 TABLET ORAL DAILY
COMMUNITY

## 2022-07-06 RX ORDER — CLOTRIMAZOLE AND BETAMETHASONE DIPROPIONATE 10; .64 MG/G; MG/G
CREAM TOPICAL 3 TIMES DAILY
COMMUNITY

## 2022-07-06 RX ORDER — METOPROLOL TARTRATE 50 MG/1
50 TABLET, FILM COATED ORAL 2 TIMES DAILY
COMMUNITY

## 2022-07-06 RX ORDER — BISACODYL 10 MG
10 SUPPOSITORY, RECTAL RECTAL DAILY PRN
COMMUNITY

## 2022-07-06 RX ORDER — FERROUS SULFATE 325(65) MG
325 TABLET ORAL
COMMUNITY

## 2022-07-06 RX ORDER — ONDANSETRON 4 MG/1
4 TABLET, FILM COATED ORAL EVERY 4 HOURS PRN
COMMUNITY

## 2022-07-06 RX ORDER — LORAZEPAM 1 MG/1
1 TABLET ORAL ONCE
Status: COMPLETED | OUTPATIENT
Start: 2022-07-06 | End: 2022-07-06

## 2022-07-06 RX ORDER — WARFARIN SODIUM 2.5 MG/1
2.5 TABLET ORAL
COMMUNITY

## 2022-07-06 RX ORDER — POTASSIUM CHLORIDE 20 MEQ/1
10 TABLET, EXTENDED RELEASE ORAL DAILY
COMMUNITY

## 2022-07-06 RX ORDER — CHLORDIAZEPOXIDE HYDROCHLORIDE 10 MG/1
10 CAPSULE, GELATIN COATED ORAL ONCE
Status: COMPLETED | OUTPATIENT
Start: 2022-07-06 | End: 2022-07-06

## 2022-07-06 RX ORDER — CHLORDIAZEPOXIDE HYDROCHLORIDE 5 MG/1
5 CAPSULE, GELATIN COATED ORAL 3 TIMES DAILY PRN
Qty: 15 CAPSULE | Refills: 0 | Status: SHIPPED | OUTPATIENT
Start: 2022-07-06 | End: 2022-08-05

## 2022-07-06 RX ORDER — SODIUM PHOSPHATE, DIBASIC AND SODIUM PHOSPHATE, MONOBASIC 7; 19 G/133ML; G/133ML
1 ENEMA RECTAL DAILY PRN
COMMUNITY

## 2022-07-06 RX ORDER — WARFARIN SODIUM 4 MG/1
4 TABLET ORAL
COMMUNITY

## 2022-07-06 RX ORDER — ASCORBIC ACID 500 MG
500 TABLET ORAL DAILY
COMMUNITY

## 2022-07-06 RX ADMIN — LORAZEPAM 1 MG: 1 TABLET ORAL at 06:21

## 2022-07-06 RX ADMIN — MAGNESIUM SULFATE HEPTAHYDRATE 1000 MG: 1 INJECTION, SOLUTION INTRAVENOUS at 07:54

## 2022-07-06 RX ADMIN — CHLORDIAZEPOXIDE HYDROCHLORIDE 10 MG: 10 CAPSULE ORAL at 12:11

## 2022-07-06 NOTE — ED PROVIDER NOTES
Briefly, this is a 78 y.o. female here for tremor. Has been ongoing for the past few days. No new medications. Was recently stopped off of her Librium. She was taking Librium for upwards of 60 years. She was told by her doctor that it could have increased risk for dizziness and falls therefore she was taken off of it. No nausea or vomiting. Denies any pain. No numbness or tingling. Ambulating at her baseline. She is currently at a rehab facility. On exam,   General: Patient is in no acute distress  Skin: No cyanosis  HEENT: Moist mucous membranes  Heart: Regular rate, regular rhythm  Lung: No respiratory distress  Abdomen: Soft, nontender  Neuro: Moving all extremities, no facial droop, no slurred speech, answers questions appropriately. Resting tremor, improved with intentional movement. NIH stroke scale 0              Screenings  NIH Stroke Scale  Interval: Baseline  Level of Consciousness (1a): Alert  LOC Questions (1b): Answers both correctly  LOC Commands (1c): Performs both tasks correctly  Best Gaze (2): Normal  Visual (3): No visual loss  Facial Palsy (4): Normal symmetrical movement  Motor Arm, Left (5a): No drift  Motor Arm, Right (5b): No drift  Motor Leg, Left (6a): No drift  Motor Leg, Right (6b): No drift  Limb Ataxia (7): Absent  Sensory (8): Normal  Best Language (9): No aphasia  Dysarthria (10): Normal  Extinction and Inattention (11): No abnormality  Total: 0Glasgow Coma Scale  Eye Opening: Spontaneous  Best Verbal Response: Oriented  Best Motor Response: Obeys commands  [place] Coma Scale Score: 15        MDM  Briefly, this is a 78 y.o. female here for tremor. May be secondary to medication change. Was taken off of Librium. Was taken off a few weeks ago. No agitation, diaphoresis, tachycardia, nausea to indicate severe withdrawal.  May be secondary to Parkinson's or benign tremor associated with aging. Lab work unremarkable except for some hypomagnesemia which was repleted. No focal neurodeficits to indicate stroke. NIH stroke scale 0. Recommended for patient to follow-up with her doctor for tremor evaluation. No UTI found. No upper respiratory infection symptoms. No headache to indicate meningitis. This patient was signed out to me at about 6 in the morning pending urinalysis by Dr Thais Brunner. He recommended that if urinalysis was unremarkable that patient be discharged with follow-up with PCP. I discussed this with the patient who is fine with this plan. Will give short course of Librium to see if this gets rid of her tremor. Is this patient to be included in the SEP-1 Core Measure due to severe sepsis or septic shock? No   Exclusion criteria - the patient is NOT to be included for SEP-1 Core Measure due to: Infection is not suspected           Patient Referrals:  Vaishnavi Israel  [address] [place] [address]  [phone]    In 1 day        Discharge Medications:  Discharge Medication List as of 7/6/2022 11:55 AM          FINAL IMPRESSION  1. Tremulousness        Blood pressure 134/79, pulse 68, temperature 97.6 °F (36.4 °C), resp. rate 16, SpO2 96 %.      For further details of [facility] emergency department encounter, please see documentation by Dr Bairon Gabriel MD  07/06/22 1640
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
## (undated) DEVICE — BAG DRNGE L6FT 20L PREFIL ABSRB POLYMER EXP TBNG DISP FOR

## (undated) DEVICE — CATHETER URET 5FR L70CM POLYUR CONE FLX TIP KINK RESIST W/

## (undated) DEVICE — Device: Brand: MEDEX

## (undated) DEVICE — CYSTO/BLADDER IRRIGATION SET, REGULATING CLAMP

## (undated) DEVICE — TRAY PREP DRY W/ PREM GLV 2 APPL 6 SPNG 2 UNDPD 1 OVERWRAP

## (undated) DEVICE — GUIDEWIRE ENDOSCP L150CM DIA0.035IN TIP 3CM PTFE NIT

## (undated) DEVICE — SOLUTION IRRIGATION STRL H2O 1000 ML UROMATIC CONTAINER

## (undated) DEVICE — CYSTO PACK: Brand: MEDLINE INDUSTRIES, INC.

## (undated) DEVICE — GLOVE ORANGE PI 8   MSG9080

## (undated) DEVICE — SOLUTION IV IRRIG WATER 1000ML POUR BRL 2F7114